# Patient Record
Sex: FEMALE | Race: WHITE | NOT HISPANIC OR LATINO | Employment: OTHER | URBAN - METROPOLITAN AREA
[De-identification: names, ages, dates, MRNs, and addresses within clinical notes are randomized per-mention and may not be internally consistent; named-entity substitution may affect disease eponyms.]

---

## 2018-06-08 ENCOUNTER — OFFICE VISIT (OUTPATIENT)
Dept: URGENT CARE | Facility: CLINIC | Age: 23
End: 2018-06-08
Payer: COMMERCIAL

## 2018-06-08 VITALS
RESPIRATION RATE: 16 BRPM | DIASTOLIC BLOOD PRESSURE: 67 MMHG | OXYGEN SATURATION: 100 % | TEMPERATURE: 99 F | HEIGHT: 64 IN | HEART RATE: 69 BPM | WEIGHT: 100 LBS | BODY MASS INDEX: 17.07 KG/M2 | SYSTOLIC BLOOD PRESSURE: 110 MMHG

## 2018-06-08 DIAGNOSIS — J06.9 VIRAL UPPER RESPIRATORY TRACT INFECTION: Primary | ICD-10-CM

## 2018-06-08 PROCEDURE — 99203 OFFICE O/P NEW LOW 30 MIN: CPT | Performed by: EMERGENCY MEDICINE

## 2018-06-08 RX ORDER — BUSPIRONE HYDROCHLORIDE 7.5 MG/1
7.5 TABLET ORAL DAILY
COMMUNITY
End: 2019-03-14

## 2018-06-08 RX ORDER — AZITHROMYCIN 250 MG/1
TABLET, FILM COATED ORAL
Qty: 6 TABLET | Refills: 0 | Status: SHIPPED | OUTPATIENT
Start: 2018-06-08 | End: 2018-06-12

## 2018-06-08 RX ORDER — CIMETIDINE 300 MG/1
300 TABLET, FILM COATED ORAL 2 TIMES DAILY
COMMUNITY
End: 2021-08-14

## 2018-06-08 RX ORDER — PREDNISONE 10 MG/1
10 TABLET ORAL DAILY
Qty: 19 TABLET | Refills: 0 | Status: SHIPPED | OUTPATIENT
Start: 2018-06-08 | End: 2018-06-15

## 2018-06-08 RX ORDER — CALCIUM POLYCARBOPHIL 625 MG 625 MG/1
625 TABLET ORAL DAILY
COMMUNITY
End: 2019-03-14

## 2018-06-08 RX ORDER — DIAZEPAM 10 MG/1
5 TABLET ORAL DAILY
COMMUNITY
End: 2021-08-14

## 2018-06-08 RX ORDER — DRONABINOL 10 MG/1
20 CAPSULE ORAL
COMMUNITY
End: 2019-03-14

## 2018-06-08 RX ORDER — LAMOTRIGINE 100 MG/1
25 TABLET ORAL DAILY
COMMUNITY
End: 2021-08-14

## 2018-06-08 RX ORDER — METHENAMINE, SODIUM PHOSPHATE, MONOBASIC, METHYLENE BLUE, AND HYOSCYAMINE SULFATE 81.6; 40.8; 10.8; .12 MG/1; MG/1; MG/1; MG/1
TABLET, COATED ORAL
COMMUNITY
End: 2019-03-14

## 2018-06-08 RX ORDER — SACCHAROMYCES BOULARDII 250 MG
250 CAPSULE ORAL 2 TIMES DAILY
COMMUNITY
End: 2019-03-14

## 2018-06-08 NOTE — PATIENT INSTRUCTIONS
You have been diagnosed with a Viral Upper Respiratory infection and your symptoms should resolve over the next 7 to 10 days with the treatments recommended today  If they do not, it is possible that you have developed a bacterial infection and you should begin taking the antibiotic prescribed at that time  Take an expectorant - guaifenesin should be the only ingredient - during the day, and the cough suppressant if needed at night only  Take Zinc 12 5 to 15 mg every 2 - 3 hrs while awake for the next few days  You may take Cold Osei (13 3 mg of Zinc) or split a 25 mg Zinc tablet or lozenge in two or a 50 mg into four to get the proper dose  The total daily dose of Zinc should exceed 75 mg per day  Upper Respiratory Infection, Ambulatory Care   GENERAL INFORMATION:   An upper respiratory infection  is also called a common cold  It can affect your nose, throat, ears, and sinuses  Common symptoms include the following:   · Runny or stuffy nose    · Sneezing and coughing    · Sore throat or hoarseness    · Red, watery, and sore eyes    · Tiredness or restlessness    · Chills and fever    · Headache, body aches, or sore muscles  Seek immediate care for the following symptoms:   · Headaches or a stiff neck    · Bright lights hurt your eyes    · Chest pain or trouble breathing  Treatment for an upper respiratory infection  may include any of the following:  · Decongestants  help decrease nasal congestion and improve your breathing  Do not use decongestant sprays for more than a few days  · Cough suppressants  help decrease coughing  Ask your healthcare provider which type of cough medicine is best for you  Some cough medicines need a doctor's order  · NSAIDs  help decrease swelling and pain or fever  This medicine is available with or without a doctor's order  NSAIDs can cause stomach bleeding or kidney problems in certain people   If you take blood thinner medicine, always ask your healthcare provider if NSAIDs are safe for you  Always read the medicine label and follow directions  Care for an upper respiratory infection:   · Rest  until your fever is gone or you feel better  · Drink liquids as directed to prevent dehydration  You may need to drink 8 to 10 cups of liquid each day  Good liquids to drink include water, ginger ale, tea, or fruit juices  · Gargle  with warm salt water to help your sore throat feel better  Mix ¼ teaspoon salt with 1 cup warm water  You may also suck on hard candy or throat lozenges  · Saline nasal drops  help loosen your nasal congestion  They can be bought without a doctor's order  · Take a warm bath or shower  to help decrease body aches and help you breathe easier  · Use a cool-mist humidifier  to increase air moisture and make it easier for you to breathe  Prevent the spread of germs:   · Avoid others for the first 2 to 3 days of your cold  Germs are easily spread during this time  · Do not share food, drinks,  towels, or personal items with others  · Wash your hands often  Use soap and water  Wash your hands after you use the bathroom, change a child's diapers, or sneeze  Wash your hands before you prepare or eat food  Cover your mouth and nose with a tissue when you sneeze or cough  Follow up with your healthcare provider as directed:  Write down your questions so you remember to ask them during your visits  CARE AGREEMENT:   You have the right to help plan your care  Learn about your health condition and how it may be treated  Discuss treatment options with your caregivers to decide what care you want to receive  You always have the right to refuse treatment  The above information is an  only  It is not intended as medical advice for individual conditions or treatments  Talk to your doctor, nurse or pharmacist before following any medical regimen to see if it is safe and effective for you    © 2014 [facility]  Information is for End User's use only and may not be sold, redistributed or otherwise used for commercial purposes  All illustrations and images included in CareNotes® are the copyrighted property of A D A M , Inc  or Kostas Ponce

## 2018-06-08 NOTE — LETTER
June 8, 2018     Patient: Oliver Araiza   YOB: 1995   Date of Visit: 6/8/2018       To Whom it May Concern:    Oliver Araiza was seen in my clinic on 6/8/2018  She may return to work on 06/11/2018  If you have any questions or concerns, please don't hesitate to call    Excuse also 6/07, 08 09, 10       Sincerely,          Crista Pena MD        CC: No Recipients

## 2018-06-09 NOTE — PROGRESS NOTES
[facility]        NAME: Ralf Khalil is a 20 y o  female  : 1995    MRN: [id number]  DATE: 2018  TIME: 8:46 PM    Assessment and Plan   Viral upper respiratory tract infection [J06 9]  1  Viral upper respiratory tract infection  predniSONE 10 mg tablet    azithromycin (ZITHROMAX) 250 mg tablet         Patient Instructions     Patient Instructions   You have been diagnosed with a Viral Upper Respiratory infection and your symptoms should resolve over the next 7 to 10 days with the treatments recommended today  If they do not, it is possible that you have developed a bacterial infection and you should begin taking the antibiotic prescribed at that time  Take an expectorant - guaifenesin should be the only ingredient - during the day, and the cough suppressant if needed at night only  Take Zinc 12 5 to 15 mg every 2 - 3 hrs while awake for the next few days  You may take Cold Osei (13 3 mg of Zinc) or split a 25 mg Zinc tablet or lozenge in two or a 50 mg into four to get the proper dose  The total daily dose of Zinc should exceed 75 mg per day  Upper Respiratory Infection, Ambulatory Care   GENERAL INFORMATION:   An upper respiratory infection  is also called a common cold  It can affect your nose, throat, ears, and sinuses  Common symptoms include the following:   · Runny or stuffy nose    · Sneezing and coughing    · Sore throat or hoarseness    · Red, watery, and sore eyes    · Tiredness or restlessness    · Chills and fever    · Headache, body aches, or sore muscles  Seek immediate care for the following symptoms:   · Headaches or a stiff neck    · Bright lights hurt your eyes    · Chest pain or trouble breathing  Treatment for an upper respiratory infection  may include any of the following:  · Decongestants  help decrease nasal congestion and improve your breathing  Do not use decongestant sprays for more than a few days  · Cough suppressants  help decrease coughing   Ask your healthcare provider which type of cough medicine is best for you  Some cough medicines need a doctor's order  · NSAIDs  help decrease swelling and pain or fever  This medicine is available with or without a doctor's order  NSAIDs can cause stomach bleeding or kidney problems in certain people  If you take blood thinner medicine, always ask your healthcare provider if NSAIDs are safe for you  Always read the medicine label and follow directions  Care for an upper respiratory infection:   · Rest  until your fever is gone or you feel better  · Drink liquids as directed to prevent dehydration  You may need to drink 8 to 10 cups of liquid each day  Good liquids to drink include water, ginger ale, tea, or fruit juices  · Gargle  with warm salt water to help your sore throat feel better  Mix ¼ teaspoon salt with 1 cup warm water  You may also suck on hard candy or throat lozenges  · Saline nasal drops  help loosen your nasal congestion  They can be bought without a doctor's order  · Take a warm bath or shower  to help decrease body aches and help you breathe easier  · Use a cool-mist humidifier  to increase air moisture and make it easier for you to breathe  Prevent the spread of germs:   · Avoid others for the first 2 to 3 days of your cold  Germs are easily spread during this time  · Do not share food, drinks,  towels, or personal items with others  · Wash your hands often  Use soap and water  Wash your hands after you use the bathroom, change a child's diapers, or sneeze  Wash your hands before you prepare or eat food  Cover your mouth and nose with a tissue when you sneeze or cough  Follow up with your healthcare provider as directed:  Write down your questions so you remember to ask them during your visits  CARE AGREEMENT:   You have the right to help plan your care  Learn about your health condition and how it may be treated   Discuss treatment options with your caregivers to decide what care you want to receive  You always have the right to refuse treatment  The above information is an  only  It is not intended as medical advice for individual conditions or treatments  Talk to your doctor, nurse or pharmacist before following any medical regimen to see if it is safe and effective for you  © 2014 [address] is for End User's use only and may not be sold, redistributed or otherwise used for commercial purposes  All illustrations and images included in CareNotes® are the copyrighted property of A D A M , Inc  or Kostas Kris  Follow up with PCP in 3-5 days  Proceed to  ER if symptoms worsen  Chief Complaint     Chief Complaint   Patient presents with    Fever     fever,body aches, extreme fatigue, and chest tightness  everyone in family has some kind of infection at this time and she feels she needs an antibiotic         History of Present Illness       Patient complains of nasal congestion, nonproductive cough, fever, chills and generalized aches for the past 3 days  Review of Systems   Review of Systems   Constitutional: Positive for chills and fever  HENT: Positive for congestion, rhinorrhea, sinus pressure and sore throat  Negative for trouble swallowing and voice change  Respiratory: Positive for cough  Negative for chest tightness, shortness of breath and wheezing  Cardiovascular: Negative for chest pain and palpitations           Current Medications       Current Outpatient Prescriptions:     busPIRone (BUSPAR) 7 5 mg tablet, Take 7 5 mg by mouth daily, Disp: , Rfl:     calcium polycarbophil (FIBERCON) 625 mg tablet, Take 625 mg by mouth daily, Disp: , Rfl:     cimetidine (TAGAMET) 300 MG tablet, Take 300 mg by mouth 2 (two) times a day, Disp: , Rfl:     diazepam (VALIUM) 10 mg tablet, Take 5 mg by mouth daily, Disp: , Rfl:     dronabinol (MARINOL) 10 MG capsule, Take 20 mg by mouth 2 (two) times a day before meals, Disp: , Rfl:     lamoTRIgine (LaMICtal) 100 mg tablet, Take 25 mg by mouth daily, Disp: , Rfl:     Methen-Hyosc-Meth Blue-Na Phos (UROGESIC-BLUE) 81 6 MG TABS, Take by mouth, Disp: , Rfl:     saccharomyces boulardii (FLORASTOR) 250 mg capsule, Take 250 mg by mouth 2 (two) times a day, Disp: , Rfl:     azithromycin (ZITHROMAX) 250 mg tablet, Take 2 tablets today then 1 tablet daily x 4 days, Disp: 6 tablet, Rfl: 0    predniSONE 10 mg tablet, Take 1 tablet (10 mg total) by mouth daily for 7 days Take once daily all days pills on following schedule  4 - 4 - 3 - 3 - 2 - 2 - 1, Disp: 19 tablet, Rfl: 0    Current Allergies     Allergies as of 06/08/2018 - Reviewed 06/08/2018   Allergen Reaction Noted    Reglan [metoclopramide] Anaphylaxis 06/08/2018    Iodine solution [povidone iodine] Hives 06/08/2018            The following portions of the patient's history were reviewed and updated as appropriate: allergies, current medications, past family history, past medical history, past social history, past surgical history and problem list      Past Medical History:   Diagnosis Date    Anxiety     Arthritis     Bipolar 1 disorder ([facility] [place] [address] )     Borderline personality disorder     Calculus of GB/BD, acute cystis w/ obst     Chronic pain     Depression     Endometriosis     Fibromyalgia     History of stomach ulcers     Migraine     Pelvic floor dysfunction     PTSD (post-traumatic stress disorder)        Past Surgical History:   Procedure Laterality Date    ADENOIDECTOMY      APPENDECTOMY      TONSILLECTOMY         No family history on file  Medications have been verified  Objective   /67   Pulse 69   Temp 99 °F (37 2 °C) (Tympanic)   Resp 16   Ht 5' 4" (1 626 m)   Wt 45 4 kg (100 lb)   LMP 06/08/2018   SpO2 100%   BMI 17 16 kg/m²        Physical Exam     Physical Exam   Constitutional: She is oriented to person, place, and time   She appears well-developed and well-nourished  No distress  HENT:   Head: Normocephalic and atraumatic  Right Ear: Tympanic membrane and external ear normal    Left Ear: Tympanic membrane and external ear normal    Nose: Mucosal edema present  Mouth/Throat: Posterior oropharyngeal erythema present  No oropharyngeal exudate or tonsillar abscesses  Neck: Neck supple  Cardiovascular: Normal rate and regular rhythm  Exam reveals no gallop  No murmur heard  Pulmonary/Chest: Effort normal  No respiratory distress  She has no wheezes  She has no rales  She exhibits no tenderness  Abdominal: Soft  Bowel sounds are normal    Neurological: She is alert and oriented to person, place, and time  Skin: Skin is warm and dry  Nursing note and vitals reviewed

## 2019-03-14 ENCOUNTER — OFFICE VISIT (OUTPATIENT)
Dept: GASTROENTEROLOGY | Facility: MEDICAL CENTER | Age: 24
End: 2019-03-14
Payer: COMMERCIAL

## 2019-03-14 VITALS
HEART RATE: 83 BPM | TEMPERATURE: 98.4 F | SYSTOLIC BLOOD PRESSURE: 126 MMHG | DIASTOLIC BLOOD PRESSURE: 80 MMHG | WEIGHT: 105.6 LBS | BODY MASS INDEX: 18.13 KG/M2

## 2019-03-14 DIAGNOSIS — R11.0 NAUSEA: ICD-10-CM

## 2019-03-14 DIAGNOSIS — E44.0 MODERATE PROTEIN-CALORIE MALNUTRITION (HCC): ICD-10-CM

## 2019-03-14 DIAGNOSIS — R63.4 WEIGHT LOSS: ICD-10-CM

## 2019-03-14 DIAGNOSIS — K59.04 CHRONIC IDIOPATHIC CONSTIPATION: ICD-10-CM

## 2019-03-14 DIAGNOSIS — R10.13 DYSPEPSIA: Primary | ICD-10-CM

## 2019-03-14 DIAGNOSIS — R10.9 STOMACH PAIN: ICD-10-CM

## 2019-03-14 PROCEDURE — 99244 OFF/OP CNSLTJ NEW/EST MOD 40: CPT | Performed by: INTERNAL MEDICINE

## 2019-03-14 RX ORDER — LIDOCAINE AND PRILOCAINE 25; 25 MG/G; MG/G
CREAM TOPICAL
Refills: 0 | COMMUNITY
Start: 2019-02-07 | End: 2021-08-16 | Stop reason: HOSPADM

## 2019-03-14 NOTE — PROGRESS NOTES
[facility] - Outpatient Consultation  Maria Elena Kyle 20 y o  female MRN: [id number]  Encounter: [id number]          ASSESSMENT AND PLAN:    Val was seen today for multiple GI complaints  She has seen multiple different Gastroenterologists  Recently had hysterectomy  Dyspepsia- will retest for these and I have advised the pt to quit all of her multiple different OTC herbals and then based on this, would make some recommendations  I hesitate to try any meds currently as she states every medication that she has tried has failed and she isn't really interested in trying anything new currently  -     Tissue transglutaminase, IgA; Future  -     IgA; Future  -     H  pylori antigen, stool; Future  -     C-reactive protein; Future  -     Calprotectin,Fecal; Future  -     Ambulatory referral to Nutrition Services; Future    Nausea    Stomach pain  She is underweight and has had a history of anorexia, I will refer her to a dietician to help her learn how to gain weight   -will check HIV and Hepatitis as well given weight loss and risk factors for Hep C including multiple tattoos and piercings    Follow up in 2 months time     ______________________________________________________________________    HPI:    Maria Elena Kyle is a 20 y o  female here for multiple GI complaints  She's had IBS for most of her life  She had an appendectomy when she was 2 after it had perforated which is when she believes the symptoms started  She had been having a lot of vaginal  bleeding from which she had a hysterectomy  She is constantanly nauseous and always has stomach pain  She has severe bloating and pain even from simple things like water, which causes her to not eat much  She is now having more constipation instead of diarrhea  She is unable to gain weight, she is constantly losing weight  This began after a pregnancy/  Currently her weight is more stable         Gastric emptying study January 2018  Se[facility] at [facility] in February 2018: small bowel follow through small hiatal hernia with some reflux and few adhesions in ileum  REVIEW OF SYSTEMS:    CONSTITUTIONAL: Denies any fever, chills, rigors, and weight loss  HEENT: No earache or tinnitus  Denies hearing loss or visual disturbances  CARDIOVASCULAR: No chest pain or palpitations  RESPIRATORY: Denies any cough, hemoptysis, shortness of breath or dyspnea on exertion  GASTROINTESTINAL: As noted in the History of Present Illness  GENITOURINARY: No problems with urination  Denies any hematuria or dysuria  NEUROLOGIC: No dizziness or vertigo, denies headaches  MUSCULOSKELETAL: Denies any muscle or joint pain  SKIN: Denies skin rashes or itching  ENDOCRINE: Denies excessive thirst  Denies intolerance to heat or cold  PSYCHOSOCIAL: Denies depression or anxiety  Denies any recent memory loss  Historical Information   Past Medical History:   Diagnosis Date    Anxiety     Arthritis     [facility]ipolar 1 [facility] [address]     Borderline personality disorder     Calculus of GB/BD, acute cystis w/ obst     Chronic pain     Depression     Endometriosis     Fibromyalgia     History of stomach ulcers     Migraine     Pelvic floor dysfunction     PTSD (post-traumatic stress disorder)      Past Surgical History:   Procedure Laterality Date    ADENOIDECTOMY      APPENDECTOMY      TONSILLECTOMY       Social History   Social History     Substance and Sexual Activity   Alcohol Use Not on file     Social History     Substance and Sexual Activity   Drug Use Not on file     Social History     Tobacco Use   Smoking Status Never Smoker   Smokeless Tobacco Never Used     No family history on file      Meds/Allergies       Current Outpatient Medications:     busPIRone (BUSPAR) 7 5 mg tablet    calcium polycarbophil (FIBERCON) 625 mg tablet    cimetidine (TAGAMET) 300 MG tablet    diazepam (VALIUM) 10 mg tablet    dronabinol (MARINOL) 10 MG capsule    lamoTRIgine (LaMICtal) 100 mg tablet    Methen-Hyosc-Meth Blue-Na Phos (UROGESIC-BLUE) 81 6 MG TABS    saccharomyces boulardii (FLORASTOR) 250 mg capsule    Allergies   Allergen Reactions    Reglan [Metoclopramide] Anaphylaxis    Iodine Solution [Povidone Iodine] Hives           Objective     There were no vitals taken for this visit  There is no height or weight on file to calculate BMI  PHYSICAL EXAM:      General Appearance:   Alert, cooperative, no distress, thin, multiple tattooes and piercings   HEENT:   Normocephalic, atraumatic, anicteric; multiple tattoes and piercings      Neck:  Supple, symmetrical, trachea midline   Lungs:   Clear to auscultation bilaterally; no rales, rhonchi or wheezing; respirations unlabored    Heart[de-identified]   Regular rate and rhythm; no murmur, rub, or gallop  Abdomen:   Soft, non-tender, non-distended; normal bowel sounds; no masses, no organomegaly    Genitalia:   Deferred    Rectal:   Deferred    Extremities:  No cyanosis, clubbing or edema    Pulses:  2+ and symmetric    Skin:  No jaundice, rashes, or lesions    Lymph nodes:  No palpable cervical lymphadenopathy        Lab Results:   Multiple labs and imaging tests reviewed in care everywhere    Attestation:   By signing my name below, IMakenzie, attest that this documentation has    been prepared under the direction and in the presence of GREGORY Recinos  Electronically Signed: Bernadette Cavazos  3/14/19      IJony personally performed the services described in this    documentation  All medical record entries made by the susieibmunira were at my    direction and in my presence  I have reviewed the chart and discharge    instructions and agree that the record reflects my personal performance and is    accurate and complete   GREGORY Recinos  3/14/19

## 2019-03-19 ENCOUNTER — TELEPHONE (OUTPATIENT)
Dept: GASTROENTEROLOGY | Facility: AMBULARY SURGERY CENTER | Age: 24
End: 2019-03-19

## 2019-03-19 NOTE — TELEPHONE ENCOUNTER
chaput patient    Her dietician named Brittany Nagy from [facility] would like to speak with you about this mutual patient        Call back  # [phone]

## 2019-03-22 NOTE — TELEPHONE ENCOUNTER
Pasha the dietician returned call stating she can be reached at [phone] [facility] or [facility] [place]  When calling this number you must say her name "Hardeep Corrigan" or say "JOISE [place] Dietician" and then you will be transferred to her

## 2021-08-11 ENCOUNTER — HOSPITAL ENCOUNTER (EMERGENCY)
Facility: HOSPITAL | Age: 26
Discharge: HOME | End: 2021-08-11
Attending: STUDENT IN AN ORGANIZED HEALTH CARE EDUCATION/TRAINING PROGRAM | Admitting: EMERGENCY MEDICINE
Payer: MEDICARE

## 2021-08-11 VITALS
SYSTOLIC BLOOD PRESSURE: 106 MMHG | HEART RATE: 94 BPM | BODY MASS INDEX: 18.66 KG/M2 | RESPIRATION RATE: 19 BRPM | OXYGEN SATURATION: 96 % | DIASTOLIC BLOOD PRESSURE: 72 MMHG | WEIGHT: 112 LBS | TEMPERATURE: 98.9 F | HEIGHT: 65 IN

## 2021-08-11 DIAGNOSIS — K52.9 GASTROENTERITIS: Primary | ICD-10-CM

## 2021-08-11 LAB
ALBUMIN SERPL BCP-MCNC: 4.1 G/DL (ref 3.5–5)
ALP SERPL-CCNC: 74 U/L (ref 46–116)
ALT SERPL W P-5'-P-CCNC: 28 U/L (ref 12–78)
ANION GAP SERPL CALCULATED.3IONS-SCNC: 10 MMOL/L (ref 4–13)
AST SERPL W P-5'-P-CCNC: 33 U/L (ref 5–45)
BASOPHILS # BLD AUTO: 0.02 THOUSANDS/ΜL (ref 0–0.1)
BASOPHILS NFR BLD AUTO: 0 % (ref 0–1)
BILIRUB SERPL-MCNC: 0.36 MG/DL (ref 0.2–1)
BILIRUB UR QL STRIP: ABNORMAL
BUN SERPL-MCNC: 9 MG/DL (ref 5–25)
CALCIUM SERPL-MCNC: 8.9 MG/DL (ref 8.3–10.1)
CHLORIDE SERPL-SCNC: 106 MMOL/L (ref 100–108)
CLARITY UR: CLEAR
CO2 SERPL-SCNC: 27 MMOL/L (ref 21–32)
COLOR UR: YELLOW
CREAT SERPL-MCNC: 0.97 MG/DL (ref 0.6–1.3)
EOSINOPHIL # BLD AUTO: 0.1 THOUSAND/ΜL (ref 0–0.61)
EOSINOPHIL NFR BLD AUTO: 2 % (ref 0–6)
ERYTHROCYTE [DISTWIDTH] IN BLOOD BY AUTOMATED COUNT: 12.7 % (ref 11.6–15.1)
GFR SERPL CREATININE-BSD FRML MDRD: 80 ML/MIN/1.73SQ M
GLUCOSE SERPL-MCNC: 85 MG/DL (ref 65–140)
GLUCOSE UR STRIP-MCNC: NEGATIVE MG/DL
HCT VFR BLD AUTO: 47 % (ref 34.8–46.1)
HGB BLD-MCNC: 15.6 G/DL (ref 11.5–15.4)
HGB UR QL STRIP.AUTO: NEGATIVE
IMM GRANULOCYTES # BLD AUTO: 0.01 THOUSAND/UL (ref 0–0.2)
IMM GRANULOCYTES NFR BLD AUTO: 0 % (ref 0–2)
KETONES UR STRIP-MCNC: ABNORMAL MG/DL
LEUKOCYTE ESTERASE UR QL STRIP: NEGATIVE
LIPASE SERPL-CCNC: 92 U/L (ref 73–393)
LYMPHOCYTES # BLD AUTO: 0.88 THOUSANDS/ΜL (ref 0.6–4.47)
LYMPHOCYTES NFR BLD AUTO: 18 % (ref 14–44)
MCH RBC QN AUTO: 30.3 PG (ref 26.8–34.3)
MCHC RBC AUTO-ENTMCNC: 33.2 G/DL (ref 31.4–37.4)
MCV RBC AUTO: 91 FL (ref 82–98)
MONOCYTES # BLD AUTO: 0.62 THOUSAND/ΜL (ref 0.17–1.22)
MONOCYTES NFR BLD AUTO: 12 % (ref 4–12)
NEUTROPHILS # BLD AUTO: 3.36 THOUSANDS/ΜL (ref 1.85–7.62)
NEUTS SEG NFR BLD AUTO: 68 % (ref 43–75)
NITRITE UR QL STRIP: NEGATIVE
NRBC BLD AUTO-RTO: 0 /100 WBCS
PH UR STRIP.AUTO: 5.5 [PH]
PLATELET # BLD AUTO: 237 THOUSANDS/UL (ref 149–390)
PMV BLD AUTO: 11.7 FL (ref 8.9–12.7)
POTASSIUM SERPL-SCNC: 4.1 MMOL/L (ref 3.5–5.3)
PROT SERPL-MCNC: 8 G/DL (ref 6.4–8.2)
PROT UR STRIP-MCNC: NEGATIVE MG/DL
RBC # BLD AUTO: 5.15 MILLION/UL (ref 3.81–5.12)
SODIUM SERPL-SCNC: 143 MMOL/L (ref 136–145)
SP GR UR STRIP.AUTO: >=1.03 (ref 1–1.03)
UROBILINOGEN UR QL STRIP.AUTO: 0.2 E.U./DL
WBC # BLD AUTO: 4.99 THOUSAND/UL (ref 4.31–10.16)

## 2021-08-11 PROCEDURE — 96361 HYDRATE IV INFUSION ADD-ON: CPT

## 2021-08-11 PROCEDURE — 96374 THER/PROPH/DIAG INJ IV PUSH: CPT

## 2021-08-11 PROCEDURE — 81003 URINALYSIS AUTO W/O SCOPE: CPT | Performed by: STUDENT IN AN ORGANIZED HEALTH CARE EDUCATION/TRAINING PROGRAM

## 2021-08-11 PROCEDURE — 99284 EMERGENCY DEPT VISIT MOD MDM: CPT

## 2021-08-11 PROCEDURE — 99284 EMERGENCY DEPT VISIT MOD MDM: CPT | Performed by: STUDENT IN AN ORGANIZED HEALTH CARE EDUCATION/TRAINING PROGRAM

## 2021-08-11 PROCEDURE — 85025 COMPLETE CBC W/AUTO DIFF WBC: CPT | Performed by: STUDENT IN AN ORGANIZED HEALTH CARE EDUCATION/TRAINING PROGRAM

## 2021-08-11 PROCEDURE — 83690 ASSAY OF LIPASE: CPT | Performed by: STUDENT IN AN ORGANIZED HEALTH CARE EDUCATION/TRAINING PROGRAM

## 2021-08-11 PROCEDURE — 96375 TX/PRO/DX INJ NEW DRUG ADDON: CPT

## 2021-08-11 PROCEDURE — 36415 COLL VENOUS BLD VENIPUNCTURE: CPT | Performed by: STUDENT IN AN ORGANIZED HEALTH CARE EDUCATION/TRAINING PROGRAM

## 2021-08-11 PROCEDURE — 80053 COMPREHEN METABOLIC PANEL: CPT | Performed by: STUDENT IN AN ORGANIZED HEALTH CARE EDUCATION/TRAINING PROGRAM

## 2021-08-11 RX ORDER — KETOROLAC TROMETHAMINE 30 MG/ML
15 INJECTION, SOLUTION INTRAMUSCULAR; INTRAVENOUS ONCE
Status: COMPLETED | OUTPATIENT
Start: 2021-08-11 | End: 2021-08-11

## 2021-08-11 RX ORDER — ONDANSETRON 2 MG/ML
4 INJECTION INTRAMUSCULAR; INTRAVENOUS ONCE
Status: COMPLETED | OUTPATIENT
Start: 2021-08-11 | End: 2021-08-11

## 2021-08-11 RX ORDER — ONDANSETRON 4 MG/1
4 TABLET, ORALLY DISINTEGRATING ORAL EVERY 6 HOURS PRN
Qty: 12 TABLET | Refills: 0 | Status: SHIPPED | OUTPATIENT
Start: 2021-08-11

## 2021-08-11 RX ADMIN — ONDANSETRON 4 MG: 2 INJECTION INTRAMUSCULAR; INTRAVENOUS at 18:36

## 2021-08-11 RX ADMIN — KETOROLAC TROMETHAMINE 15 MG: 30 INJECTION, SOLUTION INTRAMUSCULAR at 18:37

## 2021-08-11 RX ADMIN — SODIUM CHLORIDE 1000 ML: 0.9 INJECTION, SOLUTION INTRAVENOUS at 18:38

## 2021-08-11 NOTE — ED PROVIDER NOTES
History  Chief Complaint   Patient presents with    Abdominal Pain     Pt reports generalized abd pain, N/V/D for the past 3 days  States others in the house have the same sx and believes she caught what they have,       History provided by:  Patient  Vomiting  Severity:  Moderate  Duration:  4 days  Timing:  Constant  Progression:  Worsening  Chronicity:  New  Recent urination:  Decreased  Relieved by:  Nothing  Worsened by:  Nothing  Ineffective treatments:  Ice chips  Associated symptoms: abdominal pain, chills, diarrhea and fever    Associated symptoms: no cough and no myalgias    Risk factors: sick contacts    Risk factors: no suspect food intake and no travel to endemic areas       27year old F  Hx of endometriosis, PUD  Presents to the ED with nausea, vomiting, diarrhea  Having symptoms x 4 days  Has had multiple sick contacts with similar symptoms  Expresses subjective fevers, chills  T max 99 F  COVID vaccinated  Having decreased oral intake over the past 3 days more acutely over the last 24 hours  Describes her abdominal pain as cramping in nature and 10/10 in severity  Nothing makes the pain better or worse  Denies melena, bloody stool  Denies antibiotic use, travel  S/p elective hysterectomy  Prior to Admission Medications   Prescriptions Last Dose Informant Patient Reported? Taking?    NON FORMULARY  Self Yes No   Sig: Med Name: Robina Haskins   NON FORMULARY  Self Yes No   Sig: Med Name: CBD, Vitex, Elderberry, Aloe vera, Lemon Balm,   NON FORMULARY  Self Yes No   Sig: Med Name: Echinacea purpurea   NON FORMULARY  Self Yes No   Sig: Med Name: licorice Root   NON FORMULARY  Self Yes No   Sig: Med Name: Binta [place]   NON FORMULARY  Self Yes No   Sig: Med Name: Rhodiola Root   NON FORMULARY  Self Yes No   Sig: Med Name: Eleuthero Root   NON FORMULARY  Self Yes No   Sig: Med Name: D-Mannose   Probiotic Product (PROBIOTIC ADVANCED PO)  Self Yes No   Sig: Take by mouth   cimetidine (TAGAMET) 300 MG tablet Self Yes No   Sig: Take 300 mg by mouth 2 (two) times a day   diazepam (VALIUM) 10 mg tablet  Self Yes No   Sig: Take 5 mg by mouth daily   lamoTRIgine (LaMICtal) 100 mg tablet  Self Yes No   Sig: Take 25 mg by mouth daily   lidocaine-prilocaine (EMLA) cream  Self Yes No   Sig: USE ONE APPLICATION TOPICALLY AS NEEDED FOR PAIN, USE AS DIRECTED      Facility-Administered Medications: None       Past Medical History:   Diagnosis Date    Anxiety     Arthritis     Bipolar 1 disorder (HCC)     Borderline personality disorder (HCC)     Calculus of GB/BD, acute cystis w/ obst     Chronic pain     Depression     Endometriosis     Fibromyalgia     History of stomach ulcers     Migraine     Pelvic floor dysfunction     PTSD (post-traumatic stress disorder)        Past Surgical History:   Procedure Laterality Date    ADENOIDECTOMY      APPENDECTOMY      TONSILLECTOMY         History reviewed  No pertinent family history  I have reviewed and agree with the history as documented  E-Cigarette/Vaping     E-Cigarette/Vaping Substances     Social History     Tobacco Use    Smoking status: Never Smoker    Smokeless tobacco: Never Used   Substance Use Topics    Alcohol use: Yes     Comment: once a year    Drug use: Yes     Types: Marijuana       Review of Systems   Constitutional: Positive for chills and fever  HENT: Negative for congestion, rhinorrhea and sinus pain  Eyes: Negative for pain and visual disturbance  Respiratory: Negative for cough and chest tightness  Cardiovascular: Negative for chest pain and palpitations  Gastrointestinal: Positive for abdominal pain, diarrhea, nausea and vomiting  Genitourinary: Positive for decreased urine volume  Negative for dysuria and flank pain  Musculoskeletal: Negative for back pain, myalgias, neck pain and neck stiffness  Skin: Negative for color change, pallor and rash  Neurological: Negative for dizziness, weakness and light-headedness  Hematological: Does not bruise/bleed easily  Psychiatric/Behavioral: Negative for agitation and confusion  All other systems reviewed and are negative  Physical Exam  Physical Exam  Vitals and nursing note reviewed  Constitutional:       General: She is not in acute distress  Appearance: She is ill-appearing  She is not toxic-appearing  HENT:      Head: Atraumatic  Mouth/Throat:      Mouth: Mucous membranes are moist       Pharynx: Oropharynx is clear  Eyes:      Extraocular Movements: Extraocular movements intact  Pupils: Pupils are equal, round, and reactive to light  Cardiovascular:      Rate and Rhythm: Normal rate and regular rhythm  Heart sounds: Normal heart sounds  Pulmonary:      Effort: Pulmonary effort is normal       Breath sounds: Normal breath sounds  No wheezing, rhonchi or rales  Chest:      Chest wall: No tenderness  Abdominal:      General: Abdomen is flat  Bowel sounds are normal       Palpations: Abdomen is soft  Tenderness: There is generalized abdominal tenderness  There is no guarding or rebound  Negative signs include Carlton's sign  Hernia: No hernia is present  Skin:     General: Skin is warm and dry  Capillary Refill: Capillary refill takes less than 2 seconds  Coloration: Skin is not mottled  Findings: No erythema  Neurological:      General: No focal deficit present  Mental Status: She is alert and oriented to person, place, and time  Cranial Nerves: No cranial nerve deficit  Motor: No weakness  Psychiatric:         Mood and Affect: Mood normal  Mood is not anxious or depressed           Behavior: Behavior normal        Vital Signs  ED Triage Vitals   Temperature Pulse Respirations Blood Pressure SpO2   08/11/21 1758 08/11/21 1758 08/11/21 1759 08/11/21 1759 08/11/21 1759   98 9 °F (37 2 °C) 94 19 106/72 96 %      Temp Source Heart Rate Source Patient Position - Orthostatic VS BP Location FiO2 (%) 08/11/21 1758 08/11/21 1758 08/11/21 1759 08/11/21 1759 --   Temporal Monitor Lying Right arm       Pain Score       08/11/21 1759       7           Vitals:    08/11/21 1758 08/11/21 1759   BP:  106/72   Pulse: 94    Patient Position - Orthostatic VS:  Lying     ED Medications  Medications   sodium chloride 0 9 % bolus 1,000 mL (1,000 mL Intravenous New Bag 8/11/21 1838)   ondansetron (ZOFRAN) injection 4 mg (4 mg Intravenous Given 8/11/21 1836)   ketorolac (TORADOL) injection 15 mg (15 mg Intravenous Given 8/11/21 1837)     Diagnostic Studies  Results Reviewed     Procedure Component Value Units Date/Time    Comprehensive metabolic panel [[id number]] Collected: 08/11/21 1836    Lab Status: Final result Specimen: Blood from Arm, Right Updated: 08/11/21 1901     Sodium 143 mmol/L      Potassium 4 1 mmol/L      Chloride 106 mmol/L      CO2 27 mmol/L      ANION GAP 10 mmol/L      BUN 9 mg/dL      Creatinine 0 97 mg/dL      Glucose 85 mg/dL      Calcium 8 9 mg/dL      AST 33 U/L      ALT 28 U/L      Alkaline Phosphatase 74 U/L      Total Protein 8 0 g/dL      Albumin 4 1 g/dL      Total Bilirubin 0 36 mg/dL      eGFR 80 ml/min/1 73sq m     Narrative:      [place] guidelines for Chronic Kidney Disease (CKD):     Stage 1 with normal or high GFR (GFR > 90 mL/min/1 73 square meters)    Stage 2 Mild CKD (GFR = 60-89 mL/min/1 73 square meters)    Stage 3A Moderate CKD (GFR = 45-59 mL/min/1 73 square meters)    Stage 3B Moderate CKD (GFR = 30-44 mL/min/1 73 square meters)    Stage 4 Severe CKD (GFR = 15-29 mL/min/1 73 square meters)    Stage 5 End Stage CKD (GFR <15 mL/min/1 73 square meters)  Note: GFR calculation is accurate only with a steady state creatinine    Lipase [[id number]]  (Normal) Collected: 08/11/21 1836    Lab Status: Final result Specimen: Blood from Arm, Right Updated: 08/11/21 1901     Lipase 92 u/L     CBC and differential [[id number]]  (Abnormal) Collected: 08/11/21 1836 Lab Status: Final result Specimen: Blood from Arm, Right Updated: 08/11/21 1847     WBC 4 99 Thousand/uL      RBC 5 15 Million/uL      Hemoglobin 15 6 g/dL      Hematocrit 47 0 %      MCV 91 fL      MCH 30 3 pg      MCHC 33 2 g/dL      RDW 12 7 %      MPV 11 7 fL      Platelets 527 Thousands/uL      nRBC 0 /100 WBCs      Neutrophils Relative 68 %      Immat GRANS % 0 %      Lymphocytes Relative 18 %      Monocytes Relative 12 %      Eosinophils Relative 2 %      Basophils Relative 0 %      Neutrophils Absolute 3 36 Thousands/µL      Immature Grans Absolute 0 01 Thousand/uL      Lymphocytes Absolute 0 88 Thousands/µL      Monocytes Absolute 0 62 Thousand/µL      Eosinophils Absolute 0 10 Thousand/µL      Basophils Absolute 0 02 Thousands/µL     UA w Reflex to Microscopic w Reflex to Culture [[id number]]  (Abnormal) Collected: 08/11/21 1831    Lab Status: Final result Specimen: Urine, Clean Catch Updated: 08/11/21 1843     Color, UA Yellow     Clarity, UA Clear     Specific Gravity, UA >=1 030     pH, UA 5 5     Leukocytes, UA Negative     Nitrite, UA Negative     Protein, UA Negative mg/dl      Glucose, UA Negative mg/dl      Ketones, UA 15 (1+) mg/dl      Urobilinogen, UA 0 2 E U /dl      Bilirubin, UA Small     Blood, UA Negative             No orders to display          Procedures  Procedures     ED Course     MDM     29-year-old female  Presents to the emergency department with nausea, vomiting, diarrhea and abdominal pain  Expresses subjective fever/chills  Has had multiple sick contacts  Low oral intake over the past few days  Lab significant for dehydration  Patient was administered a dose of IV ketorolac which improved her abdominal discomfort  IV fluid boluses are being administered  The patient was signed out to Dr Tamela Estrella  Plan discussed  The patient was stable while under my care      Disposition  Final diagnoses:   Gastroenteritis     ED Disposition     None      Follow-up Information    None Patient's Medications   Discharge Prescriptions    No medications on file     No discharge procedures on file      PDMP Review     None          ED Provider  Electronically Signed by           Isabella Cummings DO  08/11/21 5211

## 2021-08-11 NOTE — ED CARE HANDOFF
Emergency Department Sign Out Note        Sign out and transfer of care from Dr Joslyn Cristobal  See Separate Emergency Department note  The patient, Cedric Fiore, was evaluated by the previous provider for N/V/D      Workup Completed:  Labs Reviewed   CBC AND DIFFERENTIAL - Abnormal       Result Value Ref Range Status    WBC 4 99  4 31 - 10 16 Thousand/uL Final    RBC 5 15 (*) 3 81 - 5 12 Million/uL Final    Hemoglobin 15 6 (*) 11 5 - 15 4 g/dL Final    Hematocrit 47 0 (*) 34 8 - 46 1 % Final    MCV 91  82 - 98 fL Final    MCH 30 3  26 8 - 34 3 pg Final    MCHC 33 2  31 4 - 37 4 g/dL Final    RDW 12 7  11 6 - 15 1 % Final    MPV 11 7  8 9 - 12 7 fL Final    Platelets 750  590 - 390 Thousands/uL Final    nRBC 0  /100 WBCs Final    Neutrophils Relative 68  43 - 75 % Final    Immat GRANS % 0  0 - 2 % Final    Lymphocytes Relative 18  14 - 44 % Final    Monocytes Relative 12  4 - 12 % Final    Eosinophils Relative 2  0 - 6 % Final    Basophils Relative 0  0 - 1 % Final    Neutrophils Absolute 3 36  1 85 - 7 62 Thousands/µL Final    Immature Grans Absolute 0 01  0 00 - 0 20 Thousand/uL Final    Lymphocytes Absolute 0 88  0 60 - 4 47 Thousands/µL Final    Monocytes Absolute 0 62  0 17 - 1 22 Thousand/µL Final    Eosinophils Absolute 0 10  0 00 - 0 61 Thousand/µL Final    Basophils Absolute 0 02  0 00 - 0 10 Thousands/µL Final   UA W REFLEX TO MICROSCOPIC WITH REFLEX TO CULTURE - Abnormal    Color, UA Yellow   Final    Clarity, UA Clear   Final    Specific Gravity, UA >=1 030  1 003 - 1 030 Final    pH, UA 5 5  4 5, 5 0, 5 5, 6 0, 6 5, 7 0, 7 5, 8 0 Final    Leukocytes, UA Negative  Negative Final    Nitrite, UA Negative  Negative Final    Protein, UA Negative  Negative mg/dl Final    Glucose, UA Negative  Negative mg/dl Final    Ketones, UA 15 (1+) (*) Negative mg/dl Final    Urobilinogen, UA 0 2  0 2, 1 0 E U /dl E U /dl Final    Bilirubin, UA Small (*) Negative Final    Blood, UA Negative  Negative Final   LIPASE - Normal Lipase 92  73 - 393 u/L Final   COMPREHENSIVE METABOLIC PANEL    Sodium 371  136 - 145 mmol/L Final    Potassium 4 1  3 5 - 5 3 mmol/L Final    Chloride 106  100 - 108 mmol/L Final    CO2 27  21 - 32 mmol/L Final    ANION GAP 10  4 - 13 mmol/L Final    BUN 9  5 - 25 mg/dL Final    Creatinine 0 97  0 60 - 1 30 mg/dL Final    Comment: Standardized to IDMS reference method    Glucose 85  65 - 140 mg/dL Final    Comment: If the patient is fasting, the ADA then defines impaired fasting glucose as > 100 mg/dL and diabetes as > or equal to 123 mg/dL  Specimen collection should occur prior to Sulfasalazine administration due to the potential for falsely depressed results  Specimen collection should occur prior to Sulfapyridine administration due to the potential for falsely elevated results  Calcium 8 9  8 3 - 10 1 mg/dL Final    AST 33  5 - 45 U/L Final    Comment: Specimen collection should occur prior to Sulfasalazine administration due to the potential for falsely depressed results  ALT 28  12 - 78 U/L Final    Comment: Specimen collection should occur prior to Sulfasalazine administration due to the potential for falsely depressed results  Alkaline Phosphatase 74  46 - 116 U/L Final    Total Protein 8 0  6 4 - 8 2 g/dL Final    Albumin 4 1  3 5 - 5 0 g/dL Final    Total Bilirubin 0 36  0 20 - 1 00 mg/dL Final    Comment: Use of this assay is not recommended for patients undergoing treatment with eltrombopag due to the potential for falsely elevated results      eGFR 80  ml/min/1 73sq m Final    Narrative:     [place] guidelines for Chronic Kidney Disease (CKD):     Stage 1 with normal or high GFR (GFR > 90 mL/min/1 73 square meters)    Stage 2 Mild CKD (GFR = 60-89 mL/min/1 73 square meters)    Stage 3A Moderate CKD (GFR = 45-59 mL/min/1 73 square meters)    Stage 3B Moderate CKD (GFR = 30-44 mL/min/1 73 square meters)    Stage 4 Severe CKD (GFR = 15-29 mL/min/1 73 square ONDANSETRON (ZOFRAN-ODT) 4 MG DISINTEGRATING TABLET    Take 1 tablet (4 mg total) by mouth every 6 (six) hours as needed for nausea or vomiting       Start Date: 8/11/2021 End Date: --       Order Dose: 4 mg       Quantity: 12 tablet    Refills: 0     No discharge procedures on file         ED Provider  Electronically Signed by     Brent Snyder DO  08/11/21 6171

## 2021-08-14 ENCOUNTER — APPOINTMENT (EMERGENCY)
Dept: CT IMAGING | Facility: HOSPITAL | Age: 26
DRG: 392 | End: 2021-08-14
Payer: MEDICARE

## 2021-08-14 ENCOUNTER — HOSPITAL ENCOUNTER (INPATIENT)
Facility: HOSPITAL | Age: 26
LOS: 1 days | Discharge: HOME | DRG: 392 | End: 2021-08-16
Attending: STUDENT IN AN ORGANIZED HEALTH CARE EDUCATION/TRAINING PROGRAM | Admitting: FAMILY MEDICINE
Payer: MEDICARE

## 2021-08-14 DIAGNOSIS — R10.84 DIFFUSE ABDOMINAL PAIN: ICD-10-CM

## 2021-08-14 DIAGNOSIS — R11.2 INTRACTABLE VOMITING WITH NAUSEA, UNSPECIFIED VOMITING TYPE: ICD-10-CM

## 2021-08-14 DIAGNOSIS — R21 RASH: ICD-10-CM

## 2021-08-14 DIAGNOSIS — K52.9 GASTROENTERITIS: Primary | ICD-10-CM

## 2021-08-14 PROBLEM — E87.6 HYPOKALEMIA: Status: ACTIVE | Noted: 2021-08-14

## 2021-08-14 PROBLEM — F99 PSYCHIATRIC ILLNESS: Status: ACTIVE | Noted: 2021-08-14

## 2021-08-14 LAB
ALBUMIN SERPL BCP-MCNC: 3.6 G/DL (ref 3.5–5)
ALP SERPL-CCNC: 51 U/L (ref 46–116)
ALT SERPL W P-5'-P-CCNC: 25 U/L (ref 12–78)
ANION GAP SERPL CALCULATED.3IONS-SCNC: 15 MMOL/L (ref 4–13)
AST SERPL W P-5'-P-CCNC: 33 U/L (ref 5–45)
BASOPHILS # BLD MANUAL: 0 THOUSAND/UL (ref 0–0.1)
BASOPHILS NFR MAR MANUAL: 0 % (ref 0–1)
BILIRUB SERPL-MCNC: 0.38 MG/DL (ref 0.2–1)
BUN SERPL-MCNC: 7 MG/DL (ref 5–25)
CALCIUM SERPL-MCNC: 8.3 MG/DL (ref 8.3–10.1)
CHLORIDE SERPL-SCNC: 100 MMOL/L (ref 100–108)
CO2 SERPL-SCNC: 21 MMOL/L (ref 21–32)
CREAT SERPL-MCNC: 0.75 MG/DL (ref 0.6–1.3)
EOSINOPHIL # BLD MANUAL: 0.08 THOUSAND/UL (ref 0–0.4)
EOSINOPHIL NFR BLD MANUAL: 2 % (ref 0–6)
ERYTHROCYTE [DISTWIDTH] IN BLOOD BY AUTOMATED COUNT: 12.6 % (ref 11.6–15.1)
GFR SERPL CREATININE-BSD FRML MDRD: 109 ML/MIN/1.73SQ M
GLUCOSE SERPL-MCNC: 81 MG/DL (ref 65–140)
HCT VFR BLD AUTO: 42 % (ref 34.8–46.1)
HGB BLD-MCNC: 14.5 G/DL (ref 11.5–15.4)
LACTATE SERPL-SCNC: 0.8 MMOL/L (ref 0.5–2)
LIPASE SERPL-CCNC: 92 U/L (ref 73–393)
LYMPHOCYTES # BLD AUTO: 1.3 THOUSAND/UL (ref 0.6–4.47)
LYMPHOCYTES # BLD AUTO: 32 % (ref 14–44)
MAGNESIUM SERPL-MCNC: 1.7 MG/DL (ref 1.6–2.6)
MCH RBC QN AUTO: 30.3 PG (ref 26.8–34.3)
MCHC RBC AUTO-ENTMCNC: 34.5 G/DL (ref 31.4–37.4)
MCV RBC AUTO: 88 FL (ref 82–98)
MONOCYTES # BLD AUTO: 0.41 THOUSAND/UL (ref 0–1.22)
MONOCYTES NFR BLD: 10 % (ref 4–12)
NEUTROPHILS # BLD MANUAL: 1.95 THOUSAND/UL (ref 1.85–7.62)
NEUTS BAND NFR BLD MANUAL: 4 % (ref 0–8)
NEUTS SEG NFR BLD AUTO: 44 % (ref 43–75)
PHOSPHATE SERPL-MCNC: 3.1 MG/DL (ref 2.7–4.5)
PLATELET # BLD AUTO: 202 THOUSANDS/UL (ref 149–390)
PLATELET BLD QL SMEAR: ADEQUATE
PMV BLD AUTO: 11.6 FL (ref 8.9–12.7)
POTASSIUM SERPL-SCNC: 3.3 MMOL/L (ref 3.5–5.3)
PROT SERPL-MCNC: 7.2 G/DL (ref 6.4–8.2)
RBC # BLD AUTO: 4.78 MILLION/UL (ref 3.81–5.12)
SODIUM SERPL-SCNC: 136 MMOL/L (ref 136–145)
TOTAL CELLS COUNTED SPEC: 100
VARIANT LYMPHS # BLD AUTO: 8 %
WBC # BLD AUTO: 4.06 THOUSAND/UL (ref 4.31–10.16)

## 2021-08-14 PROCEDURE — 85025 COMPLETE CBC W/AUTO DIFF WBC: CPT | Performed by: STUDENT IN AN ORGANIZED HEALTH CARE EDUCATION/TRAINING PROGRAM

## 2021-08-14 PROCEDURE — 87505 NFCT AGENT DETECTION GI: CPT | Performed by: STUDENT IN AN ORGANIZED HEALTH CARE EDUCATION/TRAINING PROGRAM

## 2021-08-14 PROCEDURE — 96375 TX/PRO/DX INJ NEW DRUG ADDON: CPT

## 2021-08-14 PROCEDURE — 83735 ASSAY OF MAGNESIUM: CPT | Performed by: STUDENT IN AN ORGANIZED HEALTH CARE EDUCATION/TRAINING PROGRAM

## 2021-08-14 PROCEDURE — 80053 COMPREHEN METABOLIC PANEL: CPT | Performed by: STUDENT IN AN ORGANIZED HEALTH CARE EDUCATION/TRAINING PROGRAM

## 2021-08-14 PROCEDURE — 83605 ASSAY OF LACTIC ACID: CPT | Performed by: STUDENT IN AN ORGANIZED HEALTH CARE EDUCATION/TRAINING PROGRAM

## 2021-08-14 PROCEDURE — 87493 C DIFF AMPLIFIED PROBE: CPT | Performed by: STUDENT IN AN ORGANIZED HEALTH CARE EDUCATION/TRAINING PROGRAM

## 2021-08-14 PROCEDURE — 83690 ASSAY OF LIPASE: CPT | Performed by: STUDENT IN AN ORGANIZED HEALTH CARE EDUCATION/TRAINING PROGRAM

## 2021-08-14 PROCEDURE — 99285 EMERGENCY DEPT VISIT HI MDM: CPT

## 2021-08-14 PROCEDURE — 36415 COLL VENOUS BLD VENIPUNCTURE: CPT | Performed by: STUDENT IN AN ORGANIZED HEALTH CARE EDUCATION/TRAINING PROGRAM

## 2021-08-14 PROCEDURE — 84100 ASSAY OF PHOSPHORUS: CPT | Performed by: STUDENT IN AN ORGANIZED HEALTH CARE EDUCATION/TRAINING PROGRAM

## 2021-08-14 PROCEDURE — G1004 CDSM NDSC: HCPCS

## 2021-08-14 PROCEDURE — 74176 CT ABD & PELVIS W/O CONTRAST: CPT

## 2021-08-14 PROCEDURE — 96365 THER/PROPH/DIAG IV INF INIT: CPT

## 2021-08-14 PROCEDURE — 85027 COMPLETE CBC AUTOMATED: CPT | Performed by: STUDENT IN AN ORGANIZED HEALTH CARE EDUCATION/TRAINING PROGRAM

## 2021-08-14 PROCEDURE — 99285 EMERGENCY DEPT VISIT HI MDM: CPT | Performed by: STUDENT IN AN ORGANIZED HEALTH CARE EDUCATION/TRAINING PROGRAM

## 2021-08-14 PROCEDURE — 85007 BL SMEAR W/DIFF WBC COUNT: CPT | Performed by: STUDENT IN AN ORGANIZED HEALTH CARE EDUCATION/TRAINING PROGRAM

## 2021-08-14 PROCEDURE — 87329 GIARDIA AG IA: CPT | Performed by: FAMILY MEDICINE

## 2021-08-14 PROCEDURE — 99220 PR INITIAL OBSERVATION CARE/DAY 70 MINUTES: CPT | Performed by: FAMILY MEDICINE

## 2021-08-14 RX ORDER — POTASSIUM CHLORIDE 20 MEQ/1
40 TABLET, EXTENDED RELEASE ORAL ONCE
Status: COMPLETED | OUTPATIENT
Start: 2021-08-14 | End: 2021-08-14

## 2021-08-14 RX ORDER — DICYCLOMINE HCL 20 MG
20 TABLET ORAL ONCE
Status: COMPLETED | OUTPATIENT
Start: 2021-08-14 | End: 2021-08-14

## 2021-08-14 RX ORDER — ONDANSETRON 2 MG/ML
4 INJECTION INTRAMUSCULAR; INTRAVENOUS ONCE
Status: COMPLETED | OUTPATIENT
Start: 2021-08-14 | End: 2021-08-14

## 2021-08-14 RX ORDER — SODIUM CHLORIDE 9 MG/ML
75 INJECTION, SOLUTION INTRAVENOUS CONTINUOUS
Status: DISCONTINUED | OUTPATIENT
Start: 2021-08-14 | End: 2021-08-15

## 2021-08-14 RX ORDER — ONDANSETRON 2 MG/ML
4 INJECTION INTRAMUSCULAR; INTRAVENOUS EVERY 6 HOURS PRN
Status: DISCONTINUED | OUTPATIENT
Start: 2021-08-14 | End: 2021-08-16 | Stop reason: HOSPADM

## 2021-08-14 RX ORDER — DIAPER,BRIEF,INFANT-TODD,DISP
EACH MISCELLANEOUS 3 TIMES DAILY
Status: DISCONTINUED | OUTPATIENT
Start: 2021-08-14 | End: 2021-08-16 | Stop reason: HOSPADM

## 2021-08-14 RX ADMIN — ONDANSETRON 4 MG: 2 INJECTION INTRAMUSCULAR; INTRAVENOUS at 04:32

## 2021-08-14 RX ADMIN — POTASSIUM CHLORIDE 40 MEQ: 1500 TABLET, EXTENDED RELEASE ORAL at 05:44

## 2021-08-14 RX ADMIN — DICYCLOMINE HYDROCHLORIDE 20 MG: 20 TABLET ORAL at 22:51

## 2021-08-14 RX ADMIN — SODIUM CHLORIDE 75 ML/HR: 0.9 INJECTION, SOLUTION INTRAVENOUS at 07:46

## 2021-08-14 RX ADMIN — HYDROCORTISONE: 1 CREAM TOPICAL at 15:58

## 2021-08-14 RX ADMIN — SODIUM CHLORIDE 75 ML/HR: 0.9 INJECTION, SOLUTION INTRAVENOUS at 20:05

## 2021-08-14 RX ADMIN — DICYCLOMINE HYDROCHLORIDE 20 MG: 20 TABLET ORAL at 05:43

## 2021-08-14 RX ADMIN — ONDANSETRON 4 MG: 2 INJECTION INTRAMUSCULAR; INTRAVENOUS at 15:57

## 2021-08-14 RX ADMIN — SODIUM CHLORIDE, SODIUM LACTATE, POTASSIUM CHLORIDE, AND CALCIUM CHLORIDE 1000 ML: .6; .31; .03; .02 INJECTION, SOLUTION INTRAVENOUS at 04:41

## 2021-08-14 NOTE — PLAN OF CARE
Problem: Nutrition/Hydration-ADULT  Goal: Nutrient/Hydration intake appropriate for improving, restoring or maintaining nutritional needs  Description: Monitor and assess patient's nutrition/hydration status for malnutrition  Collaborate with interdisciplinary team and initiate plan and interventions as ordered  Monitor patient's weight and dietary intake as ordered or per policy  Utilize nutrition screening tool and intervene as necessary  Determine patient's food preferences and provide high-protein, high-caloric foods as appropriate       INTERVENTIONS:  - Monitor oral intake, urinary output, labs, and treatment plans  - Assess nutrition and hydration status and recommend course of action  - Evaluate amount of meals eaten  - Assist patient with eating if necessary   - Allow adequate time for meals  - Recommend/ encourage appropriate diets, oral nutritional supplements, and vitamin/mineral supplements  - Order, calculate, and assess calorie counts as needed  - Recommend, monitor, and adjust tube feedings and TPN/PPN based on assessed needs  - Assess need for intravenous fluids  - Provide specific nutrition/hydration education as appropriate  - Include patient/family/caregiver in decisions related to nutrition  Outcome: Progressing     Problem: PAIN - ADULT  Goal: Verbalizes/displays adequate comfort level or baseline comfort level  Description: Interventions:  - Encourage patient to monitor pain and request assistance  - Assess pain using appropriate pain scale  - Administer analgesics based on type and severity of pain and evaluate response  - Implement non-pharmacological measures as appropriate and evaluate response  - Consider cultural and social influences on pain and pain management  - Notify physician/advanced practitioner if interventions unsuccessful or patient reports new pain  Outcome: Progressing     Problem: DISCHARGE PLANNING  Goal: Discharge to home or other facility with appropriate resources  Description: INTERVENTIONS:  - Identify barriers to discharge w/patient and caregiver  - Arrange for needed discharge resources and transportation as appropriate  - Identify discharge learning needs (meds, wound care, etc )  - Arrange for interpretive services to assist at discharge as needed  - Refer to Case Management Department for coordinating discharge planning if the patient needs post-hospital services based on physician/advanced practitioner order or complex needs related to functional status, cognitive ability, or social support system  Outcome: Progressing     Problem: Knowledge Deficit  Goal: Patient/family/caregiver demonstrates understanding of disease process, treatment plan, medications, and discharge instructions  Description: Complete learning assessment and assess knowledge base    Interventions:  - Provide teaching at level of understanding  - Provide teaching via preferred learning methods  Outcome: Progressing     Problem: GASTROINTESTINAL - ADULT  Goal: Minimal or absence of nausea and/or vomiting  Description: INTERVENTIONS:  - Administer IV fluids if ordered to ensure adequate hydration  - Maintain NPO status until nausea and vomiting are resolved  - Nasogastric tube if ordered  - Administer ordered antiemetic medications as needed  - Provide nonpharmacologic comfort measures as appropriate  - Advance diet as tolerated, if ordered  - Consider nutrition services referral to assist patient with adequate nutrition and appropriate food choices  Outcome: Progressing  Goal: Maintains or returns to baseline bowel function  Description: INTERVENTIONS:  - Assess bowel function  - Encourage oral fluids to ensure adequate hydration  - Administer IV fluids if ordered to ensure adequate hydration  - Administer ordered medications as needed  - Encourage mobilization and activity  - Consider nutritional services referral to assist patient with adequate nutrition and appropriate food choices  Outcome: Progressing  Goal: Maintains adequate nutritional intake  Description: INTERVENTIONS:  - Monitor percentage of each meal consumed  - Identify factors contributing to decreased intake, treat as appropriate  - Assist with meals as needed  - Monitor I&O, weight, and lab values if indicated  - Obtain nutrition services referral as needed  Outcome: Progressing  Goal: Establish and maintain optimal ostomy function  Description: INTERVENTIONS:  - Assess bowel function  - Encourage oral fluids to ensure adequate hydration  - Administer IV fluids if ordered to ensure adequate hydration   - Administer ordered medications as needed  - Encourage mobilization and activity  - Nutrition services referral to assist patient with appropriate food choices  - Assess stoma site  - Consider wound care consult   Outcome: Progressing

## 2021-08-14 NOTE — ED PROVIDER NOTES
History  Chief Complaint   Patient presents with    Abdominal Pain     continued abdominal pain, nausea, and vomiting  dx'd with gastroenteritis 3 days ago  History provided by:  Patient  Abdominal Pain  Pain location:  Generalized  Pain quality: cramping    Pain radiates to:  Does not radiate  Pain severity:  Moderate  Onset quality:  Gradual  Duration:  6 days  Timing:  Constant  Progression:  Worsening  Chronicity:  New  Context: sick contacts    Context: not recent travel and not suspicious food intake    Relieved by:  Nothing  Worsened by: Movement  Ineffective treatments:  Liquids, lying down and position changes  Associated symptoms: anorexia, diarrhea, fatigue, nausea and vomiting    Associated symptoms: no chest pain, no chills, no constipation, no cough, no dysuria, no fever and no shortness of breath       27year old F  Presents to the ED with diffuse abdominal pain, nausea/vomiting, diarrhea  The patient was evaluated in the ED and was diagnosed with gastroenteritis on 8/11  The patient's symptoms started 6 days ago  Having worsening abdominal pain, diarrhea over the last 2 days  Having multiple episodes per day  Has been having decreased oral intake  Describes her pain as cramping in nature and 7/10 in severity  Movement exacerbates her pain  Hasn't been taking anything for pain  Prior to Admission Medications   Prescriptions Last Dose Informant Patient Reported? Taking?    NON FORMULARY  Self Yes No   Sig: Med Name: Serenity Panning   NON FORMULARY  Self Yes No   Sig: Med Name: CBD, Vitex, Elderberry, Aloe vera, Lemon Balm,   NON FORMULARY  Self Yes No   Sig: Med Name: Echinacea purpurea   NON FORMULARY  Self Yes No   Sig: Med Name: licorice Root   NON FORMULARY  Self Yes No   Sig: Med Name: Rebecca Chicas   NON FORMULARY  Self Yes No   Sig: Med Name: Rhodiola Root   NON FORMULARY  Self Yes No   Sig: Med Name: Eleuthero Root   NON FORMULARY  Self Yes No   Sig: Med Name: D-Mannose   Probiotic Product (PROBIOTIC ADVANCED PO)  Self Yes No   Sig: Take by mouth   cimetidine (TAGAMET) 300 MG tablet  Self Yes No   Sig: Take 300 mg by mouth 2 (two) times a day   diazepam (VALIUM) 10 mg tablet  Self Yes No   Sig: Take 5 mg by mouth daily   lamoTRIgine (LaMICtal) 100 mg tablet  Self Yes No   Sig: Take 25 mg by mouth daily   lidocaine-prilocaine (EMLA) cream  Self Yes No   Sig: USE ONE APPLICATION TOPICALLY AS NEEDED FOR PAIN, USE AS DIRECTED   ondansetron (ZOFRAN-ODT) 4 mg disintegrating tablet   No No   Sig: Take 1 tablet (4 mg total) by mouth every 6 (six) hours as needed for nausea or vomiting      Facility-Administered Medications: None       Past Medical History:   Diagnosis Date    Anxiety     Arthritis     Bipolar 1 disorder (HCC)     Borderline personality disorder (HCC)     Calculus of GB/BD, acute cystis w/ obst     Chronic pain     Depression     Endometriosis     Fibromyalgia     History of stomach ulcers     Migraine     Pelvic floor dysfunction     PTSD (post-traumatic stress disorder)        Past Surgical History:   Procedure Laterality Date    ADENOIDECTOMY      APPENDECTOMY      TONSILLECTOMY         No family history on file  I have reviewed and agree with the history as documented  E-Cigarette/Vaping     E-Cigarette/Vaping Substances     Social History     Tobacco Use    Smoking status: Never Smoker    Smokeless tobacco: Never Used   Substance Use Topics    Alcohol use: Yes     Comment: once a year    Drug use: Yes     Types: Marijuana       Review of Systems   Constitutional: Positive for fatigue  Negative for chills and fever  HENT: Negative for congestion, rhinorrhea, sinus pressure and sinus pain  Eyes: Negative for pain and visual disturbance  Respiratory: Negative for cough, chest tightness and shortness of breath  Cardiovascular: Negative for chest pain and palpitations     Gastrointestinal: Positive for abdominal pain, anorexia, diarrhea, nausea and vomiting  Negative for constipation  Genitourinary: Negative for dysuria, flank pain and urgency  Musculoskeletal: Negative for back pain, joint swelling, neck pain and neck stiffness  Skin: Negative for color change and rash  Neurological: Negative for dizziness, weakness, light-headedness and headaches  Hematological: Does not bruise/bleed easily  Psychiatric/Behavioral: Positive for sleep disturbance  Negative for confusion  All other systems reviewed and are negative  Physical Exam  Physical Exam  Vitals and nursing note reviewed  Exam conducted with a chaperone present  Constitutional:       General: She is not in acute distress  Appearance: She is ill-appearing  She is not toxic-appearing  HENT:      Head: Normocephalic and atraumatic  Mouth/Throat:      Pharynx: Oropharynx is clear  Comments: Dry mucous membranes  Eyes:      Extraocular Movements: Extraocular movements intact  Pupils: Pupils are equal, round, and reactive to light  Cardiovascular:      Rate and Rhythm: Normal rate and regular rhythm  Heart sounds: Normal heart sounds  Pulmonary:      Effort: Pulmonary effort is normal       Breath sounds: Normal breath sounds  No wheezing, rhonchi or rales  Chest:      Chest wall: No tenderness  Abdominal:      General: Abdomen is flat  Bowel sounds are normal  There is no distension  Palpations: Abdomen is soft  Tenderness: There is generalized abdominal tenderness  There is guarding  There is no right CVA tenderness, left CVA tenderness or rebound  Negative signs include Carlton's sign  Hernia: No hernia is present  Skin:     General: Skin is warm and dry  Capillary Refill: Capillary refill takes less than 2 seconds  Neurological:      General: No focal deficit present  Mental Status: She is alert and oriented to person, place, and time  Cranial Nerves: No cranial nerve deficit  Motor: No weakness     Psychiatric: Mood and Affect: Mood normal  Mood is not anxious or depressed  Behavior: Behavior normal        Vital Signs  ED Triage Vitals [08/14/21 0328]   Temperature Pulse Respirations Blood Pressure SpO2   98 5 °F (36 9 °C) 85 14 119/76 98 %      Temp Source Heart Rate Source Patient Position - Orthostatic VS BP Location FiO2 (%)   Temporal -- Lying Right arm --      Pain Score       Worst Possible Pain           Vitals:    08/14/21 0328   BP: 119/76   Pulse: 85   Patient Position - Orthostatic VS: Lying     ED Medications  Medications   ondansetron (ZOFRAN) injection 4 mg (4 mg Intravenous Given 8/14/21 1557)   lactated ringers bolus 1,000 mL (0 mL Intravenous Stopped 8/14/21 0611)   ondansetron (ZOFRAN) injection 4 mg (4 mg Intravenous Given 8/14/21 0432)   dicyclomine (BENTYL) tablet 20 mg (20 mg Oral Given 8/14/21 0543)   potassium chloride (K-DUR,KLOR-CON) CR tablet 40 mEq (40 mEq Oral Given 8/14/21 0544)     Diagnostic Studies  Results Reviewed     Procedure Component Value Units Date/Time    Manual Differential(PHLEBS Do Not Order) [[id number]]  (Abnormal) Collected: 08/14/21 0431    Lab Status: Final result Specimen: Blood from Arm, Left Updated: 08/14/21 0651     Segmented % 44 %      Bands % 4 %      Lymphocytes % 32 %      Monocytes % 10 %      Eosinophils, % 2 %      Basophils % 0 %      Atypical Lymphocytes % 8 %      Absolute Neutrophils 1 95 Thousand/uL      Lymphocytes Absolute 1 30 Thousand/uL      Monocytes Absolute 0 41 Thousand/uL      Eosinophils Absolute 0 08 Thousand/uL      Basophils Absolute 0 00 Thousand/uL      Total Counted 100     Platelet Estimate Adequate    Stool Enteric Bacterial Panel by PCR [[id number]] Collected: 08/14/21 0541    Lab Status: In process Specimen: Stool from Per Rectum Updated: 08/14/21 0635    Clostridium difficile toxin by PCR with EIA [[id number]] Collected: 08/14/21 0540    Lab Status:  In process Specimen: Stool from Per Rectum Updated: 08/14/21 0543 Comprehensive metabolic panel [[id number]]  (Abnormal) Collected: 08/14/21 0431    Lab Status: Final result Specimen: Blood from Arm, Left Updated: 08/14/21 0518     Sodium 136 mmol/L      Potassium 3 3 mmol/L      Chloride 100 mmol/L      CO2 21 mmol/L      ANION GAP 15 mmol/L      BUN 7 mg/dL      Creatinine 0 75 mg/dL      Glucose 81 mg/dL      Calcium 8 3 mg/dL      AST 33 U/L      ALT 25 U/L      Alkaline Phosphatase 51 U/L      Total Protein 7 2 g/dL      Albumin 3 6 g/dL      Total Bilirubin 0 38 mg/dL      eGFR 109 ml/min/1 73sq m     Narrative:      [place] guidelines for Chronic Kidney Disease (CKD):     Stage 1 with normal or high GFR (GFR > 90 mL/min/1 73 square meters)    Stage 2 Mild CKD (GFR = 60-89 mL/min/1 73 square meters)    Stage 3A Moderate CKD (GFR = 45-59 mL/min/1 73 square meters)    Stage 3B Moderate CKD (GFR = 30-44 mL/min/1 73 square meters)    Stage 4 Severe CKD (GFR = 15-29 mL/min/1 73 square meters)    Stage 5 End Stage CKD (GFR <15 mL/min/1 73 square meters)  Note: GFR calculation is accurate only with a steady state creatinine    Lipase [[id number]]  (Normal) Collected: 08/14/21 0431    Lab Status: Final result Specimen: Blood from Arm, Left Updated: 08/14/21 0518     Lipase 92 u/L     Magnesium [[id number]]  (Normal) Collected: 08/14/21 0431    Lab Status: Final result Specimen: Blood from Arm, Left Updated: 08/14/21 0518     Magnesium 1 7 mg/dL     Phosphorus [[id number]]  (Normal) Collected: 08/14/21 0431    Lab Status: Final result Specimen: Blood from Arm, Left Updated: 08/14/21 0518     Phosphorus 3 1 mg/dL     Lactic acid, plasma [[id number]]  (Normal) Collected: 08/14/21 0431    Lab Status: Final result Specimen: Blood from Arm, Left Updated: 08/14/21 0512     LACTIC ACID 0 8 mmol/L     Narrative:      Result may be elevated if tourniquet was used during collection      CBC and differential [[id number]]  (Abnormal) Collected: 08/14/21 0431    Lab Status: Final result Specimen: Blood from Arm, Left Updated: 08/14/21 0504     WBC 4 06 Thousand/uL      RBC 4 78 Million/uL      Hemoglobin 14 5 g/dL      Hematocrit 42 0 %      MCV 88 fL      MCH 30 3 pg      MCHC 34 5 g/dL      RDW 12 6 %      MPV 11 6 fL      Platelets 142 Thousands/uL     Narrative: This is an appended report  These results have been appended to a previously verified report  CT abdomen pelvis wo contrast   Final Result by Christina Ramirez MD (08/14 5822)      No acute intra-abdominal abnormality  No free air or free fluid  Nondilated fluid-filled small bowel are noted throughout the abdomen or pelvis which may be related to a mild gastroenteritis  No large or small bowel obstruction  Workstation performed: [id number]                Procedures  Procedures     ED Course     MDM     78-year-old female  Presents to the emergency department with a diffuse abdominal pain, worsening gastroenteritis  Was evaluated in the ED for abdominal pain, N/V/D  Work up was largely unremarkable  Was administered a dose of IV Zofran, Toradol and IVF  +diffuse abdominal pain on exam  Mild dehydration--hypokalemia  Renal function normal  CT consistent with gastroenteritis  The patient was administered Zofran, Bentyl  Continues to have abdominal pain and inability to tolerate PO  Admitted to IM  The patient was stable while under my care       Disposition  Final diagnoses:   Gastroenteritis   Intractable vomiting with nausea, unspecified vomiting type   Diffuse abdominal pain     Time reflects when diagnosis was documented in both MDM as applicable and the Disposition within this note     Time User Action Codes Description Comment    8/14/2021  6:59 AM Graciella Gisella [K52 9] Gastroenteritis     8/14/2021  6:59 AM Gayleen Osler Add [R11 2] Intractable vomiting with nausea, unspecified vomiting type     8/14/2021  7:00 AM Gayleen Osler Add [R10 84] Diffuse abdominal pain ED Disposition     ED Disposition Condition Date/Time Comment    Admit Stable Sat Aug 14, 2021  6:59 AM Case was discussed with Te[facility] Service and the patient's admission status was agreed to be Admission Status: observation status to the service of Dr Parul Pollard   Follow-up Information    None         Patient's Medications   Discharge Prescriptions    No medications on file     No discharge procedures on file      PDMP Review     None          ED Provider  Electronically Signed by           Isabella Cummings DO  08/14/21 4551

## 2021-08-14 NOTE — H&P
Candy 1995, 27 y o  female MRN: [id number]  Unit/Bed#: ED 10 Encounter: [id number]  Primary Care Provider: Marissa Gross MD   Date and time admitted to hospital: 8/14/2021  3:21 AM    * Gastroenteritis  Assessment & Plan  · Abdominal cramping at diarrhea for 6 days, diagnosed with gastroenteritis 3 days ago  · CT abdomen pelvis: No acute intra-abdominal abnormality  No free air or free fluid  Nondilated fluid-filled small bowel are noted throughout the abdomen or pelvis which may be related to a mild gastroenteritis  No large or small bowel obstruction  · NPO-advance diet as tolerated  · Normal saline at 75 mL/hour  · Supportive care  · Check enteric panel        Hypokalemia  Assessment & Plan  · Potassium is 3 3  · 40 mEq given orally in the ER  · Recheck metabolic panel and trend potassium    Rash  Assessment & Plan  · Rash to left arm 4 cm round, red, crusting with small area of erythema to right flank and left chest  · Serial exams, monitor for worsening    Psychiatric illness  Assessment & Plan  · History of PSTD, bipolar 1 disorder, borderline personality disorder  · She is not on prescription meds-she takes supplements including Ashwaganda  · Supportive care    VTE Prophylaxis: Pharmacologic VTE Prophylaxis contraindicated due to VTE score 1  / reason for no mechanical VTE prophylaxis VTE score 1   Code Status:  Full  POLST: POLST is not applicable to this patient  Discussion with family:  Patient    Anticipated Length of Stay:  Patient will be admitted on an Observation basis with an anticipated length of stay of  less than 2 midnights  Justification for Hospital Stay:  Per plan above    Total Time for Visit, including Counseling / Coordination of Care: 30 minutes  Greater than 50% of this total time spent on direct patient counseling and coordination of care      Chief Complaint:   Abdominal pain    History of Present Illness:    Early Greta Zarina Arriaga is a 27 y o  female with history of bipolar disorder borderline personality disorder cholelithiasis, fibromyalgia, gastric ulcers, migraine, PTSD who presents with abdominal pain  She was diagnosed with gastroenteritis 3 days ago and is having difficulty tolerating oral intake  She is also having diarrhea  She says her pain is severe, cramping, constant, and worsened by movement  She has seen numerous gastroenterologist in the past for diarrhea and weight loss, however she states that no one has been able to identify the cause  She denies any travel, camping, suspicious food exposure, or animal exposure  She denies fever or chills, dysphagia, cough, chest pain, dysuria, flank pain, back pain, myalgias, or focal weakness  Review of Systems:    Review of Systems   Constitutional: Negative for chills and fever  HENT: Positive for congestion  Respiratory: Negative for cough and shortness of breath  Cardiovascular: Negative for chest pain, palpitations and leg swelling  Gastrointestinal: Positive for abdominal pain, diarrhea and nausea  Negative for abdominal distention, constipation and vomiting  Genitourinary: Negative for dysuria and frequency  Musculoskeletal: Negative for arthralgias and myalgias  Skin: Positive for rash  Neurological: Negative for dizziness, syncope, weakness and headaches  Psychiatric/Behavioral: Positive for dysphoric mood  Negative for suicidal ideas  All other systems reviewed and are negative        Past Medical and Surgical History:     Past Medical History:   Diagnosis Date    Anxiety     Arthritis     Bipolar 1 disorder ([facility] )     Borderline personality disorder ([facility] )     Calculus of GB/BD, acute cystis w/ obst     Chronic pain     Depression     Endometriosis     Fibromyalgia     History of stomach ulcers     Migraine     Pelvic floor dysfunction     PTSD (post-traumatic stress disorder)        Past Surgical History:   Procedure Laterality Date  ADENOIDECTOMY      APPENDECTOMY      TONSILLECTOMY         Meds/Allergies:    Prior to Admission medications    Medication Sig Start Date End Date Taking? Authorizing Provider   cimetidine (TAGAMET) 300 MG tablet Take 300 mg by mouth 2 (two) times a day    Historical Provider, MD   diazepam (VALIUM) 10 mg tablet Take 5 mg by mouth daily    Historical Provider, MD   lamoTRIgine (LaMICtal) 100 mg tablet Take 25 mg by mouth daily    Historical Provider, MD   lidocaine-prilocaine (EMLA) cream USE ONE APPLICATION TOPICALLY AS NEEDED FOR PAIN, USE AS DIRECTED 2/7/19   Historical Provider, MD   NON FORMULARY Med Name: Enrico Sung    Historical Provider, MD   NON FORMULARY Med Name: CBD, Vitex, Elderberry, Aloe vera, Geoffery Double,    Historical Provider, MD   NON FORMULARY Med Name: Echinacea purpurea    Historical Provider, MD   NON FORMULARY Med Name: licorice Root    Historical Provider, MD   [address] [place] [address] Name: Judit Knox Provider, MD   [address] [place] [address] Name: Rhodiola Root    Historical Provider, MD   NON FORMULARY Med Name: Eleuthero Root    Historical Provider, MD   NON FORMULARY Med Name: D-Mannose    Historical Provider, MD   ondansetron (ZOFRAN-ODT) 4 mg disintegrating tablet Take 1 tablet (4 mg total) by mouth every 6 (six) hours as needed for nausea or vomiting 8/11/21   Russel Worrell,    Probiotic Product (PROBIOTIC ADVANCED PO) Take by mouth    Historical Provider, MD     I have reviewed home medications with patient personally  Allergies: Allergies   Allergen Reactions    Amoxicillin Itching and Shortness Of Breath     Other reaction(s):  Other (Please comment)  S  OB, rapid heart rate, chest pain      Atomoxetine Anaphylaxis and Swelling     Other reaction(s): Edema airway    Iodinated Diagnostic Agents Hives    Metoclopramide Anaphylaxis, Edema, Shortness Of Breath and Swelling     Other reaction(s): Edema face/lips/tongue  Twitching and throat closing  hives      Progesterone Shortness Of Breath and Chest Pain    Guaifenesin      Other reaction(s): Other (See Comments)  Felt very depressed     Fish Oil - Food Allergy Itching    Iodine Solution [Povidone Iodine] Hives       Social History:     Marital Status:    Occupation:  Disabled  Patient Pre-hospital Living Situation:  Home  Patient Pre-hospital Level of Mobility:  Independent  Patient Pre-hospital Diet Restrictions:  None  Substance Use History:   Social History     Substance and Sexual Activity   Alcohol Use Yes    Comment: once a year     Social History     Tobacco Use   Smoking Status Never Smoker   Smokeless Tobacco Never Used     Social History     Substance and Sexual Activity   Drug Use Yes    Types: Marijuana       Family History:    non-contributory    Physical Exam:     Vitals:   Blood Pressure: 111/55 (08/14/21 0612)  Pulse: 88 (08/14/21 0612)  Temperature: 98 5 °F (36 9 °C) (08/14/21 0328)  Temp Source: Temporal (08/14/21 0328)  Respirations: 16 (08/14/21 0612)  Weight - Scale: 49 7 kg (109 lb 9 1 oz) (08/14/21 0328)  SpO2: 98 % (08/14/21 0612)    Physical Exam  Vitals and nursing note reviewed  Constitutional:       Appearance: She is underweight  HENT:      Head: Normocephalic and atraumatic  Mouth/Throat:      Mouth: Mucous membranes are moist       Pharynx: Oropharynx is clear  Eyes:      Extraocular Movements: Extraocular movements intact  Pupils: Pupils are equal, round, and reactive to light  Cardiovascular:      Rate and Rhythm: Normal rate and regular rhythm  Pulses: Normal pulses  Heart sounds: Normal heart sounds  Pulmonary:      Effort: Pulmonary effort is normal       Breath sounds: Normal breath sounds  Abdominal:      General: Abdomen is flat  Bowel sounds are normal       Palpations: Abdomen is soft  Musculoskeletal:         General: Normal range of motion  Cervical back: Normal range of motion and neck supple     Skin:     General: Skin is warm and dry  Capillary Refill: Capillary refill takes less than 2 seconds  Findings: Rash present  Neurological:      General: No focal deficit present  Mental Status: She is alert and oriented to person, place, and time  Additional Data:     Lab Results: I have personally reviewed pertinent reports  Results from last 7 days   Lab Units 08/14/21  0431 08/11/21  1836   WBC Thousand/uL 4 06* 4 99   HEMOGLOBIN g/dL 14 5 15 6*   HEMATOCRIT % 42 0 47 0*   PLATELETS Thousands/uL 202 237   BANDS PCT % 4  --    NEUTROS PCT %  --  68   LYMPHS PCT %  --  18   LYMPHO PCT % 32  --    MONOS PCT %  --  12   MONO PCT % 10  --    EOS PCT % 2 2     Results from last 7 days   Lab Units 08/14/21  0431   SODIUM mmol/L 136   POTASSIUM mmol/L 3 3*   CHLORIDE mmol/L 100   CO2 mmol/L 21   BUN mg/dL 7   CREATININE mg/dL 0 75   ANION GAP mmol/L 15*   CALCIUM mg/dL 8 3   ALBUMIN g/dL 3 6   TOTAL BILIRUBIN mg/dL 0 38   ALK PHOS U/L 51   ALT U/L 25   AST U/L 33   GLUCOSE RANDOM mg/dL 81                 Results from last 7 days   Lab Units 08/14/21  0431   LACTIC ACID mmol/L 0 8       Imaging: I have personally reviewed pertinent reports  CT abdomen pelvis wo contrast   Final Result by Garret Azul MD (08/14 7284)      No acute intra-abdominal abnormality  No free air or free fluid  Nondilated fluid-filled small bowel are noted throughout the abdomen or pelvis which may be related to a mild gastroenteritis  No large or small bowel obstruction  Workstation performed: [id number]           [employer] / [employer] Records Reviewed: Yes     ** Please Note: This note has been constructed using a voice recognition system   **

## 2021-08-14 NOTE — ASSESSMENT & PLAN NOTE
· History of PSTD, bipolar 1 disorder, borderline personality disorder  · She is not on prescription meds-she takes supplements including Ashwaganda  · Supportive care

## 2021-08-14 NOTE — ASSESSMENT & PLAN NOTE
· Abdominal cramping at diarrhea for 6 days, diagnosed with gastroenteritis 3 days ago  · CT abdomen pelvis: No acute intra-abdominal abnormality  No free air or free fluid  Nondilated fluid-filled small bowel are noted throughout the abdomen or pelvis which may be related to a mild gastroenteritis    No large or small bowel obstruction  · NPO-advance diet as tolerated  · Normal saline at 75 mL/hour  · Supportive care  · Check enteric panel

## 2021-08-15 LAB
ANION GAP SERPL CALCULATED.3IONS-SCNC: 13 MMOL/L (ref 4–13)
BUN SERPL-MCNC: 3 MG/DL (ref 5–25)
C DIFF TOX B TCDB STL QL NAA+PROBE: NEGATIVE
CALCIUM SERPL-MCNC: 8.5 MG/DL (ref 8.3–10.1)
CAMPYLOBACTER DNA SPEC NAA+PROBE: NORMAL
CHLORIDE SERPL-SCNC: 109 MMOL/L (ref 100–108)
CO2 SERPL-SCNC: 19 MMOL/L (ref 21–32)
CREAT SERPL-MCNC: 0.8 MG/DL (ref 0.6–1.3)
ERYTHROCYTE [DISTWIDTH] IN BLOOD BY AUTOMATED COUNT: 12.9 % (ref 11.6–15.1)
GFR SERPL CREATININE-BSD FRML MDRD: 101 ML/MIN/1.73SQ M
GLUCOSE P FAST SERPL-MCNC: 74 MG/DL (ref 65–99)
GLUCOSE SERPL-MCNC: 74 MG/DL (ref 65–140)
HCT VFR BLD AUTO: 40.7 % (ref 34.8–46.1)
HGB BLD-MCNC: 13.4 G/DL (ref 11.5–15.4)
MCH RBC QN AUTO: 29.5 PG (ref 26.8–34.3)
MCHC RBC AUTO-ENTMCNC: 32.9 G/DL (ref 31.4–37.4)
MCV RBC AUTO: 90 FL (ref 82–98)
PLATELET # BLD AUTO: 187 THOUSANDS/UL (ref 149–390)
PMV BLD AUTO: 11.4 FL (ref 8.9–12.7)
POTASSIUM SERPL-SCNC: 3.8 MMOL/L (ref 3.5–5.3)
RBC # BLD AUTO: 4.54 MILLION/UL (ref 3.81–5.12)
SALMONELLA DNA SPEC QL NAA+PROBE: NORMAL
SHIGA TOXIN STX GENE SPEC NAA+PROBE: NORMAL
SHIGELLA DNA SPEC QL NAA+PROBE: NORMAL
SODIUM SERPL-SCNC: 141 MMOL/L (ref 136–145)
WBC # BLD AUTO: 3.48 THOUSAND/UL (ref 4.31–10.16)

## 2021-08-15 PROCEDURE — 85027 COMPLETE CBC AUTOMATED: CPT | Performed by: FAMILY MEDICINE

## 2021-08-15 PROCEDURE — 99232 SBSQ HOSP IP/OBS MODERATE 35: CPT | Performed by: FAMILY MEDICINE

## 2021-08-15 PROCEDURE — 80048 BASIC METABOLIC PNL TOTAL CA: CPT | Performed by: NURSE PRACTITIONER

## 2021-08-15 RX ORDER — SODIUM CHLORIDE, SODIUM LACTATE, POTASSIUM CHLORIDE, CALCIUM CHLORIDE 600; 310; 30; 20 MG/100ML; MG/100ML; MG/100ML; MG/100ML
100 INJECTION, SOLUTION INTRAVENOUS CONTINUOUS
Status: DISCONTINUED | OUTPATIENT
Start: 2021-08-15 | End: 2021-08-16 | Stop reason: HOSPADM

## 2021-08-15 RX ADMIN — SODIUM CHLORIDE, SODIUM LACTATE, POTASSIUM CHLORIDE, AND CALCIUM CHLORIDE 100 ML/HR: .6; .31; .03; .02 INJECTION, SOLUTION INTRAVENOUS at 12:17

## 2021-08-15 RX ADMIN — SODIUM CHLORIDE 75 ML/HR: 0.9 INJECTION, SOLUTION INTRAVENOUS at 08:40

## 2021-08-15 RX ADMIN — HYDROCORTISONE: 1 CREAM TOPICAL at 08:40

## 2021-08-15 RX ADMIN — ONDANSETRON 4 MG: 2 INJECTION INTRAMUSCULAR; INTRAVENOUS at 05:43

## 2021-08-15 RX ADMIN — HYDROCORTISONE: 1 CREAM TOPICAL at 21:48

## 2021-08-15 NOTE — PROGRESS NOTES
[address]  Progress Note - Tracie Rosado 1995, 27 y o  female MRN: [id number]  Unit/Bed#: -[facility] Encounter: [id number]  Primary Care Provider: Lisette James MD   Date and time admitted to hospital: 8/14/2021  3:21 AM    * Gastroenteritis  Assessment & Plan  · Abdominal cramping at diarrhea for 6 days, diagnosed with gastroenteritis 3 days ago  · CT abdomen pelvis: No acute intra-abdominal abnormality  No free air or free fluid  Nondilated fluid-filled small bowel are noted throughout the abdomen or pelvis which may be related to a mild gastroenteritis  No large or small bowel obstruction  · Normal saline at 75 mL/hour  · Supportive care  · C diff negative, Giardia and enteric panel pending  · Mild metabolic acidosis noted today despite IV fluid hydration due to persistent diarrhea  · Patient was reporting that she is having close to 20 loose liquidy bowel movements today initially there for greenish color and now they are yellowish color  Also complaining of increasing abdominal cramping today  Will keep NPO after midnight and consult placed to GI and converted to a full admission due to intractable diarrhea and abdominal pain        Rash  Assessment & Plan  · Rash to left arm 4 cm round, red, crusting with small area of erythema to right flank and left chest  · Serial exams, monitor for worsening    Placed on hydrocortisone topical ointment    Psychiatric illness  Assessment & Plan  · History of PSTD, bipolar 1 disorder, borderline personality disorder  · She is not on prescription meds-she takes supplements including Ashwaganda  · Supportive care    Hypokalemia  Assessment & Plan  · Potassium is 3 8 today  · Continue IV fluid hydration and recheck BMP tomorrow      VTE Pharmacologic Prophylaxis:   Pharmacologic: Pharmacologic VTE Prophylaxis contraindicated due to Low risk  Mechanical VTE Prophylaxis in Place: Yes    Patient Centered Rounds: I have performed bedside rounds with nursing staff today  Discussions with Specialists or Other Care Team Provider:  None    Education and Discussions with Family / Patient:  Discussed with patient at bedside    Time Spent for Care: 30 minutes  More than 50% of total time spent on counseling and coordination of care as described above  Current Length of Stay: 0 day(s)    Current Patient Status: Inpatient   Certification Statement: The patient will continue to require additional inpatient hospital stay due to Intractable diarrhea    Discharge Plan:  Noted to full admission  Continue IV fluid hydration keep her on of full liquid diet for now and NPO after midnight and GI eval tomorrow    Code Status: Level 1 - Full Code      Subjective:   Patient complains of increasing abdominal cramping today  Yesterday she states she feels a little bit better with the pain but today the pain is back  Had almost 20 loose watery stools yesterday initially they were greenish in color and now they are light yellow  Objective:     Vitals:   Temp (24hrs), Av 9 °F (36 6 °C), Min:97 6 °F (36 4 °C), Max:98 2 °F (36 8 °C)    Temp:  [97 6 °F (36 4 °C)-98 2 °F (36 8 °C)] 97 6 °F (36 4 °C)  HR:  [70-81] 70  Resp:  [16-18] 18  BP: (104-109)/(59-70) 108/59  SpO2:  [96 %-98 %] 98 %  Body mass index is 18 23 kg/m²  Input and Output Summary (last 24 hours): Intake/Output Summary (Last 24 hours) at 8/15/2021 1155  Last data filed at 2021 2300  Gross per 24 hour   Intake 1193 75 ml   Output --   Net 1193 75 ml       Physical Exam:     Physical Exam  Vitals and nursing note reviewed  Constitutional:       Appearance: Normal appearance  HENT:      Head: Normocephalic and atraumatic  Right Ear: External ear normal       Left Ear: External ear normal       Nose: Nose normal       Mouth/Throat:      Pharynx: Oropharynx is clear  Eyes:      Pupils: Pupils are equal, round, and reactive to light     Cardiovascular:      Rate and Rhythm: Normal rate and regular rhythm  Heart sounds: Normal heart sounds  Pulmonary:      Effort: Pulmonary effort is normal       Breath sounds: Normal breath sounds  Abdominal:      General: Bowel sounds are normal       Palpations: Abdomen is soft  Tenderness: There is abdominal tenderness  Musculoskeletal:         General: Normal range of motion  Cervical back: Normal range of motion and neck supple  Skin:     General: Skin is warm and dry  Capillary Refill: Capillary refill takes less than 2 seconds  Neurological:      General: No focal deficit present  Mental Status: She is alert and oriented to person, place, and time  Psychiatric:      Comments: Anxious           Additional Data:     Labs:    Results from last 7 days   Lab Units 08/15/21  0535 08/14/21  0431 08/11/21  1836   WBC Thousand/uL 3 48* 4 06* 4 99   HEMOGLOBIN g/dL 13 4 14 5 15 6*   HEMATOCRIT % 40 7 42 0 47 0*   PLATELETS Thousands/uL 187 202 237   BANDS PCT %  --  4  --    NEUTROS PCT %  --   --  68   LYMPHS PCT %  --   --  18   LYMPHO PCT %  --  32  --    MONOS PCT %  --   --  12   MONO PCT %  --  10  --    EOS PCT %  --  2 2     Results from last 7 days   Lab Units 08/15/21  0535 08/14/21  0431   SODIUM mmol/L 141 136   POTASSIUM mmol/L 3 8 3 3*   CHLORIDE mmol/L 109* 100   CO2 mmol/L 19* 21   BUN mg/dL 3* 7   CREATININE mg/dL 0 80 0 75   ANION GAP mmol/L 13 15*   CALCIUM mg/dL 8 5 8 3   ALBUMIN g/dL  --  3 6   TOTAL BILIRUBIN mg/dL  --  0 38   ALK PHOS U/L  --  51   ALT U/L  --  25   AST U/L  --  33   GLUCOSE RANDOM mg/dL 74 81                 Results from last 7 days   Lab Units 08/14/21  0431   LACTIC ACID mmol/L 0 8           * I Have Reviewed All Lab Data Listed Above  * Additional Pertinent Lab Tests Reviewed:  Gwen 66 Admission Reviewed    Imaging:    Imaging Reports Reviewed Today Include:  None  Imaging Personally Reviewed by Myself Includes:  None    Recent Cultures (last 7 days): Results from last 7 days   Lab Units 08/14/21  0540   C DIFF TOXIN B BY PCR  Negative       Last 24 Hours Medication List:   Current Facility-Administered Medications   Medication Dose Route Frequency Provider Last Rate    hydrocortisone   Topical TID Saurav Chang MD      lactated ringers  100 mL/hr Intravenous Continuous Saurav Chang MD      ondansetron  4 mg Intravenous Q6H PRN RADHAMES Curran          Today, Patient Was Seen By: Saurav Chang MD    ** Please Note: Dictation voice to text software may have been used in the creation of this document   **

## 2021-08-15 NOTE — PLAN OF CARE
Problem: Nutrition/Hydration-ADULT  Goal: Nutrient/Hydration intake appropriate for improving, restoring or maintaining nutritional needs  Description: Monitor and assess patient's nutrition/hydration status for malnutrition  Collaborate with interdisciplinary team and initiate plan and interventions as ordered  Monitor patient's weight and dietary intake as ordered or per policy  Utilize nutrition screening tool and intervene as necessary  Determine patient's food preferences and provide high-protein, high-caloric foods as appropriate       INTERVENTIONS:  - Monitor oral intake, urinary output, labs, and treatment plans  - Assess nutrition and hydration status and recommend course of action  - Evaluate amount of meals eaten  - Assist patient with eating if necessary   - Allow adequate time for meals  - Recommend/ encourage appropriate diets, oral nutritional supplements, and vitamin/mineral supplements  - Order, calculate, and assess calorie counts as needed  - Recommend, monitor, and adjust tube feedings and TPN/PPN based on assessed needs  - Assess need for intravenous fluids  - Provide specific nutrition/hydration education as appropriate  - Include patient/family/caregiver in decisions related to nutrition  Outcome: Progressing     Problem: PAIN - ADULT  Goal: Verbalizes/displays adequate comfort level or baseline comfort level  Description: Interventions:  - Encourage patient to monitor pain and request assistance  - Assess pain using appropriate pain scale  - Administer analgesics based on type and severity of pain and evaluate response  - Implement non-pharmacological measures as appropriate and evaluate response  - Consider cultural and social influences on pain and pain management  - Notify physician/advanced practitioner if interventions unsuccessful or patient reports new pain  Outcome: Progressing     Problem: DISCHARGE PLANNING  Goal: Discharge to home or other facility with appropriate resources  Description: INTERVENTIONS:  - Identify barriers to discharge w/patient and caregiver  - Arrange for needed discharge resources and transportation as appropriate  - Identify discharge learning needs (meds, wound care, etc )  - Arrange for interpretive services to assist at discharge as needed  - Refer to Case Management Department for coordinating discharge planning if the patient needs post-hospital services based on physician/advanced practitioner order or complex needs related to functional status, cognitive ability, or social support system  Outcome: Progressing     Problem: Knowledge Deficit  Goal: Patient/family/caregiver demonstrates understanding of disease process, treatment plan, medications, and discharge instructions  Description: Complete learning assessment and assess knowledge base    Interventions:  - Provide teaching at level of understanding  - Provide teaching via preferred learning methods  Outcome: Progressing     Problem: GASTROINTESTINAL - ADULT  Goal: Minimal or absence of nausea and/or vomiting  Description: INTERVENTIONS:  - Administer IV fluids if ordered to ensure adequate hydration  - Maintain NPO status until nausea and vomiting are resolved  - Nasogastric tube if ordered  - Administer ordered antiemetic medications as needed  - Provide nonpharmacologic comfort measures as appropriate  - Advance diet as tolerated, if ordered  - Consider nutrition services referral to assist patient with adequate nutrition and appropriate food choices  Outcome: Progressing  Goal: Maintains or returns to baseline bowel function  Description: INTERVENTIONS:  - Assess bowel function  - Encourage oral fluids to ensure adequate hydration  - Administer IV fluids if ordered to ensure adequate hydration  - Administer ordered medications as needed  - Encourage mobilization and activity  - Consider nutritional services referral to assist patient with adequate nutrition and appropriate food choices  Outcome: Progressing  Goal: Maintains adequate nutritional intake  Description: INTERVENTIONS:  - Monitor percentage of each meal consumed  - Identify factors contributing to decreased intake, treat as appropriate  - Assist with meals as needed  - Monitor I&O, weight, and lab values if indicated  - Obtain nutrition services referral as needed  Outcome: Progressing  Goal: Establish and maintain optimal ostomy function  Description: INTERVENTIONS:  - Assess bowel function  - Encourage oral fluids to ensure adequate hydration  - Administer IV fluids if ordered to ensure adequate hydration   - Administer ordered medications as needed  - Encourage mobilization and activity  - Nutrition services referral to assist patient with appropriate food choices  - Assess stoma site  - Consider wound care consult   Outcome: Progressing     Problem: Potential for Falls  Goal: Patient will remain free of falls  Description: INTERVENTIONS:  - Educate patient/family on patient safety including physical limitations  - Instruct patient to call for assistance with activity   - Consult OT/PT to assist with strengthening/mobility   - Keep Call bell within reach  - Keep bed low and locked with side rails adjusted as appropriate  - Keep care items and personal belongings within reach  - Initiate and maintain comfort rounds  - Make Fall Risk Sign visible to staff  - Apply yellow socks and bracelet for high fall risk patients  - Consider moving patient to room near nurses station  Outcome: Progressing

## 2021-08-15 NOTE — ASSESSMENT & PLAN NOTE
· Rash to left arm 4 cm round, red, crusting with small area of erythema to right flank and left chest  · Serial exams, monitor for worsening    Placed on hydrocortisone topical ointment

## 2021-08-15 NOTE — ASSESSMENT & PLAN NOTE
· Abdominal cramping at diarrhea for 6 days, diagnosed with gastroenteritis 3 days ago  · CT abdomen pelvis: No acute intra-abdominal abnormality  No free air or free fluid  Nondilated fluid-filled small bowel are noted throughout the abdomen or pelvis which may be related to a mild gastroenteritis  No large or small bowel obstruction  · Normal saline at 75 mL/hour  · Supportive care  · C diff negative, Giardia and enteric panel pending  · Mild metabolic acidosis noted today despite IV fluid hydration due to persistent diarrhea  · Patient was reporting that she is having close to 20 loose liquidy bowel movements today initially there for greenish color and now they are yellowish color  Also complaining of increasing abdominal cramping today    Will keep NPO after midnight and consult placed to GI and converted to a full admission due to intractable diarrhea and abdominal pain

## 2021-08-16 VITALS
HEIGHT: 65 IN | RESPIRATION RATE: 17 BRPM | DIASTOLIC BLOOD PRESSURE: 69 MMHG | WEIGHT: 109.57 LBS | TEMPERATURE: 97.6 F | BODY MASS INDEX: 18.26 KG/M2 | SYSTOLIC BLOOD PRESSURE: 111 MMHG | OXYGEN SATURATION: 95 % | HEART RATE: 92 BPM

## 2021-08-16 LAB
ANION GAP SERPL CALCULATED.3IONS-SCNC: 12 MMOL/L (ref 4–13)
BUN SERPL-MCNC: 1 MG/DL (ref 5–25)
CALCIUM SERPL-MCNC: 8.8 MG/DL (ref 8.3–10.1)
CHLORIDE SERPL-SCNC: 105 MMOL/L (ref 100–108)
CO2 SERPL-SCNC: 24 MMOL/L (ref 21–32)
CREAT SERPL-MCNC: 0.76 MG/DL (ref 0.6–1.3)
ERYTHROCYTE [DISTWIDTH] IN BLOOD BY AUTOMATED COUNT: 13.2 % (ref 11.6–15.1)
G LAMBLIA AG STL QL IA: NEGATIVE
GFR SERPL CREATININE-BSD FRML MDRD: 107 ML/MIN/1.73SQ M
GLUCOSE SERPL-MCNC: 82 MG/DL (ref 65–140)
HCT VFR BLD AUTO: 40.8 % (ref 34.8–46.1)
HGB BLD-MCNC: 14 G/DL (ref 11.5–15.4)
MCH RBC QN AUTO: 30.5 PG (ref 26.8–34.3)
MCHC RBC AUTO-ENTMCNC: 34.3 G/DL (ref 31.4–37.4)
MCV RBC AUTO: 89 FL (ref 82–98)
PLATELET # BLD AUTO: 187 THOUSANDS/UL (ref 149–390)
PMV BLD AUTO: 11.1 FL (ref 8.9–12.7)
POTASSIUM SERPL-SCNC: 3.3 MMOL/L (ref 3.5–5.3)
PROCALCITONIN SERPL-MCNC: <0.05 NG/ML
RBC # BLD AUTO: 4.59 MILLION/UL (ref 3.81–5.12)
SODIUM SERPL-SCNC: 141 MMOL/L (ref 136–145)
WBC # BLD AUTO: 3.31 THOUSAND/UL (ref 4.31–10.16)

## 2021-08-16 PROCEDURE — 99239 HOSP IP/OBS DSCHRG MGMT >30: CPT | Performed by: FAMILY MEDICINE

## 2021-08-16 PROCEDURE — 80048 BASIC METABOLIC PNL TOTAL CA: CPT | Performed by: FAMILY MEDICINE

## 2021-08-16 PROCEDURE — 84145 PROCALCITONIN (PCT): CPT | Performed by: FAMILY MEDICINE

## 2021-08-16 PROCEDURE — 85027 COMPLETE CBC AUTOMATED: CPT | Performed by: FAMILY MEDICINE

## 2021-08-16 PROCEDURE — RECHECK: Performed by: FAMILY MEDICINE

## 2021-08-16 RX ORDER — DIAPER,BRIEF,INFANT-TODD,DISP
EACH MISCELLANEOUS 3 TIMES DAILY
Qty: 30 G | Refills: 0 | Status: SHIPPED | OUTPATIENT
Start: 2021-08-16

## 2021-08-16 RX ORDER — POTASSIUM CHLORIDE 14.9 MG/ML
20 INJECTION INTRAVENOUS ONCE
Status: COMPLETED | OUTPATIENT
Start: 2021-08-16 | End: 2021-08-16

## 2021-08-16 RX ADMIN — HYDROCORTISONE: 1 CREAM TOPICAL at 16:58

## 2021-08-16 RX ADMIN — POTASSIUM CHLORIDE 20 MEQ: 14.9 INJECTION, SOLUTION INTRAVENOUS at 09:53

## 2021-08-16 RX ADMIN — HYDROCORTISONE: 1 CREAM TOPICAL at 09:56

## 2021-08-16 RX ADMIN — SODIUM CHLORIDE, SODIUM LACTATE, POTASSIUM CHLORIDE, AND CALCIUM CHLORIDE 100 ML/HR: .6; .31; .03; .02 INJECTION, SOLUTION INTRAVENOUS at 10:16

## 2021-08-16 RX ADMIN — SODIUM CHLORIDE, SODIUM LACTATE, POTASSIUM CHLORIDE, AND CALCIUM CHLORIDE 100 ML/HR: .6; .31; .03; .02 INJECTION, SOLUTION INTRAVENOUS at 00:31

## 2021-08-16 NOTE — ASSESSMENT & PLAN NOTE
· Abdominal cramping at diarrhea for 8 days  · CT abdomen pelvis: No acute intra-abdominal abnormality  No free air or free fluid  Nondilated fluid-filled small bowel are noted throughout the abdomen or pelvis which may be related to a mild gastroenteritis  No large or small bowel obstruction  · Normal saline at 75 mL/hour  · Supportive care  · C diff negative and enteric panel neg  Giardia pending and fecal calprotectin ordered  · Patient was reporting that she is having close to 20 loose liquidy bowel movements on Saturday and 11 on Sunday  initially stool was greenish color and now they are yellowish color  Also complaining of increasing abdominal cramping   · Ask GI for consultation    Will place on soft lactose pre meal and observe

## 2021-08-16 NOTE — ASSESSMENT & PLAN NOTE
· Rash to left arm 4 cm round, red, crusting with small area of erythema to right flank and left chest  ·  Placed on hydrocortisone topical ointment and now improving

## 2021-08-16 NOTE — DISCHARGE SUMMARY
[address]  Discharge- Norberto Renny 1995, 27 y o  female MRN: [id number]  Unit/Bed#: -[facility] Encounter: [id number]  Primary Care Provider: Josie Cortez MD   Date and time admitted to hospital: 8/14/2021  3:21 AM    * Gastroenteritis  Assessment & Plan  · Abdominal cramping at diarrhea for 8 days  · CT abdomen pelvis: No acute intra-abdominal abnormality  No free air or free fluid  Nondilated fluid-filled small bowel are noted throughout the abdomen or pelvis which may be related to a mild gastroenteritis  No large or small bowel obstruction  · Normal saline at 75 mL/hour  · Supportive care  · C diff negative and enteric panel neg  Giardia pending and fecal calprotectin ordered  · Patient was reporting that she is having close to 20 loose liquidy bowel movements on Saturday and 11 on Sunday  · Diarrhea has resolved and abdominal pain has resolved  Tolerated soft diet  Will discharge home today with outpatient follow-up with PCP and or GI        Rash  Assessment & Plan  · Rash to left arm 4 cm round, red, crusting with small area of erythema to right flank and left chest  ·  Placed on hydrocortisone topical ointment and now improving    Psychiatric illness  Assessment & Plan  · History of PSTD, bipolar 1 disorder, borderline personality disorder  · She is not on prescription meds-she takes supplements including Ashwaganda  · Supportive care    Hypokalemia  Assessment & Plan  · Potassium is 3 3 today     Given supplementation      Discharging Physician / Practitioner: Cammie Haskins MD  PCP: Josie Cortez MD  Admission Date:   Admission Orders (From admission, onward)     Ordered        08/15/21 1151  Inpatient Admission  Once         08/14/21 0700  Place in Observation  Once                   Discharge Date: 08/16/21    Medical Problems     Resolved Problems  Date Reviewed: 8/16/2021    None                Consultations During Hospital Stay:  · GI    Procedures Performed:     · None    Significant Findings / Test Results:     CT abdomen pelvis wo contrast    Result Date: 8/14/2021  Impression: No acute intra-abdominal abnormality  No free air or free fluid  Nondilated fluid-filled small bowel are noted throughout the abdomen or pelvis which may be related to a mild gastroenteritis  No large or small bowel obstruction  Workstation performed: [id number]     Incidental Findings:   · None     Test Results Pending at Discharge (will require follow up): · None     Outpatient Tests Requested:  · None    Complications:  None    Reason for Admission:  Intractable abdominal pain and diarrhea    Hospital Course:     Servando Giordano is a 27 y o  female patient who originally presented to the hospital on 8/14/2021 due to acute viral gastroenteritis  Patient has had 7-8 days nausea vomiting diarrhea and abdominal pain which has now finally resolved  Tolerating a soft diet  Stool cultures including C diff and enteric panel are negative  Discharge home on a soft diet and follow up outpatient PCP    Please see above list of diagnoses and related plan for additional information  Condition at Discharge: good     Discharge Day Visit / Exam:     * Please refer to separate progress note for these details *    Discussion with Family:  Discussed with family at bedside    Discharge instructions/Information to patient and family:   See after visit summary for information provided to patient and family  Provisions for Follow-Up Care:  See after visit summary for information related to follow-up care and any pertinent home health orders  Disposition:     Home    For Discharges to [facility]:   · Not Applicable to this Patient - Not Applicable to this Patient    Planned Readmission:  None     Discharge Statement:  I spent 35 minutes discharging the patient  This time was spent on the day of discharge  I had direct contact with the patient on the day of discharge  Greater than 50% of the total time was spent examining patient, answering all patient questions, arranging and discussing plan of care with patient as well as directly providing post-discharge instructions  Additional time then spent on discharge activities  Discharge Medications:  See after visit summary for reconciled discharge medications provided to patient and family        ** Please Note: This note has been constructed using a voice recognition system **

## 2021-08-16 NOTE — NURSING NOTE
Peripheral IV removed  Belongings reviewed  AVS reviewed with pt, verbalized understanding  Pt walking out with mother at discharge  No medications to return

## 2021-08-16 NOTE — DISCHARGE INSTR - AVS FIRST PAGE
Please consume a soft diet for the next 3-4 days  Avoid any deep fried spicy or saucy foods    You may consume fruits and well cooked vegetables, chicken and fish  Avoid dairy products for the next 1 week

## 2021-08-16 NOTE — CONSULTS
Consultation - [facility]  Ifeanyimunira Bender 27 y o  female MRN: [id number]  Unit/Bed#: -01 Encounter: [id number]        Consults    Reason for Consult / Principal Problem:     Acute diarrhea  Nausea with vomiting  Abdominal pain      ASSESSMENT AND PLAN:      Acute diarrhea  Nausea with vomiting  Abdominal pain  -symptoms and history are consistent with likely infectious etiology probably viral  -C diff and stool enteric panel negative, Giardia pending  -patient tolerated lunch today last bowel movement was yesterday  -supportive measures  -Bentyl Levsin as needed for abdominal cramping  -PPI x1 month  -bland diet and advance as tolerated, avoid dairy for 2 weeks  -suggested outpatient GI follow-up in 1 month, however patient would like to think about this; I provided her my business card; given that she has chronic abdominal symptoms this likely warrants further workup including additional stool studies, blood work and likely endoscopic evaluation      ______________________________________________________________________    HPI:  18-year-old female seen in consultation for acute diarrhea with associated nausea and vomiting  She has significant past medical history for fibromyalgia, migraine, endometriosis, pelvic floor dysfunction, bipolar, borderline personality disorder, PTSD sciatica depression, interstitial cystitis, IBS  She is on lot herbal supplements for management of her chronic conditions  She is also taking antibiotics and Zofran as needed  She has medical marijuana card and uses daily  She presents with complaints of nausea, vomiting, nonbloody diarrhea for the past 9 days more progressive in nature associated with abdominal pain and abdominal cramping without fever  She is unclear if she ate anything different however she notes that people in her household had similar symptoms    She initially presented to the ED on 08/11 and was discharged after IV hydration as she felt better however symptoms returned and worsened therefore she came back on 08/14 and was admitted  She denies having colonoscopy in the past   She denies excessive NSAID use or alcohol use  She denies family history of IBD  CT scan showed signs of gastroenteritis otherwise within acceptable limits  REVIEW OF SYSTEMS:    CONSTITUTIONAL: Denies any fever, chills, rigors, and weight loss  HEENT: No earache or tinnitus  Denies hearing loss or visual disturbances  CARDIOVASCULAR: No chest pain or palpitations  RESPIRATORY: Denies any cough, hemoptysis, shortness of breath or dyspnea on exertion  GASTROINTESTINAL: As noted in the History of Present Illness  GENITOURINARY: No problems with urination  Denies any hematuria or dysuria  NEUROLOGIC: No dizziness or vertigo, denies headaches  MUSCULOSKELETAL: Denies any muscle or joint pain  SKIN: Denies skin rashes or itching  ENDOCRINE: Denies excessive thirst  Denies intolerance to heat or cold  PSYCHOSOCIAL: Denies depression or anxiety  Denies any recent memory loss  Historical Information   Past Medical History:   Diagnosis Date    Anxiety     Arthritis     Bipolar 1 disorder ([facility] )     Borderline personality disorder ([facility] )     Calculus of GB/BD, acute cystis w/ obst     Chronic pain     Depression     Endometriosis     Fibromyalgia     History of stomach ulcers     Migraine     Pelvic floor dysfunction     PTSD (post-traumatic stress disorder)      Past Surgical History:   Procedure Laterality Date    ADENOIDECTOMY      APPENDECTOMY      TONSILLECTOMY       Social History   Social History     Substance and Sexual Activity   Alcohol Use Yes    Comment: once a year     Social History     Substance and Sexual Activity   Drug Use Yes    Types: Marijuana     Social History     Tobacco Use   Smoking Status Never Smoker   Smokeless Tobacco Never Used     No family history on file      Meds/Allergies     Medications Prior to Admission   Medication    lidocaine-prilocaine (EMLA) cream    NON FORMULARY    NON FORMULARY    NON FORMULARY    NON FORMULARY    NON FORMULARY    NON FORMULARY    NON FORMULARY    NON FORMULARY    ondansetron (ZOFRAN-ODT) 4 mg disintegrating tablet    Probiotic Product (PROBIOTIC ADVANCED PO)     Current Facility-Administered Medications   Medication Dose Route Frequency    hydrocortisone 1 % cream   Topical TID    lactated ringers infusion  100 mL/hr Intravenous Continuous    ondansetron (ZOFRAN) injection 4 mg  4 mg Intravenous Q6H PRN       Allergies   Allergen Reactions    Amoxicillin Itching and Shortness Of Breath     Other reaction(s): Other (Please comment)  S  OB, rapid heart rate, chest pain      Atomoxetine Anaphylaxis and Swelling     Other reaction(s): Edema airway    Iodinated Diagnostic Agents Hives    Metoclopramide Anaphylaxis, Edema, Shortness Of Breath and Swelling     Other reaction(s): Edema face/lips/tongue  Twitching and throat closing  hives      Progesterone Shortness Of Breath and Chest Pain    Guaifenesin      Other reaction(s): Other (See Comments)  Felt very depressed     Fish Oil - Food Allergy Itching    Iodine Solution [Povidone Iodine] Hives           Objective     Blood pressure 111/69, pulse 92, temperature 97 6 °F (36 4 °C), resp  rate 17, height 5' 5" (1 651 m), weight 49 7 kg (109 lb 9 1 oz), last menstrual period 06/08/2018, SpO2 95 %  Body mass index is 18 23 kg/m²        Intake/Output Summary (Last 24 hours) at 8/16/2021 1531  Last data filed at 8/16/2021 1302  Gross per 24 hour   Intake 2538 33 ml   Output 3125 ml   Net -586 67 ml         PHYSICAL EXAM:      General Appearance:   Alert, cooperative, no distress   HEENT:   Normocephalic, atraumatic, anicteric      Neck:  Supple, symmetrical, trachea midline   Lungs:   respirations unlabored    Heart[de-identified]   Regular rate   Abdomen:   Mild epigastric tenderness , soft, non-distended    Genitalia:   Deferred  Rectal:   Deferred    Extremities:  No cyanosis, clubbing or edema        Skin:  No jaundice, rashes, or lesions    Lymph nodes:  No palpable cervical lymphadenopathy        Lab Results:   Admission on 08/14/2021   Component Date Value    WBC 08/14/2021 4 06*    RBC 08/14/2021 4 78     Hemoglobin 08/14/2021 14 5     Hematocrit 08/14/2021 42 0     MCV 08/14/2021 88     MCH 08/14/2021 30 3     MCHC 08/14/2021 34 5     RDW 08/14/2021 12 6     MPV 08/14/2021 11 6     Platelets 52/61/6716 202     Sodium 08/14/2021 136     Potassium 08/14/2021 3 3*    Chloride 08/14/2021 100     CO2 08/14/2021 21     ANION GAP 08/14/2021 15*    BUN 08/14/2021 7     Creatinine 08/14/2021 0 75     Glucose 08/14/2021 81     Calcium 08/14/2021 8 3     AST 08/14/2021 33     ALT 08/14/2021 25     Alkaline Phosphatase 08/14/2021 51     Total Protein 08/14/2021 7 2     Albumin 08/14/2021 3 6     Total Bilirubin 08/14/2021 0 38     eGFR 08/14/2021 109     Lipase 08/14/2021 92     LACTIC ACID 08/14/2021 0 8      C difficile toxin by PC* 08/14/2021 Negative     Salmonella sp PCR 08/14/2021 None Detected     Shigella sp/Enteroinvasi* 08/14/2021 None Detected     Campylobacter sp (jejuni* 08/14/2021 None Detected     Shiga toxin 1/Shiga toxi* 08/14/2021 None Detected     Magnesium 08/14/2021 1 7     Phosphorus 08/14/2021 3 1     Segmented % 08/14/2021 44     Bands % 08/14/2021 4     Lymphocytes % 08/14/2021 32     Monocytes % 08/14/2021 10     Eosinophils, % 08/14/2021 2     Basophils % 08/14/2021 0     Atypical Lymphocytes % 08/14/2021 8*    Absolute Neutrophils 08/14/2021 1 95     Lymphocytes Absolute 08/14/2021 1 30     Monocytes Absolute 08/14/2021 0 41     Eosinophils Absolute 08/14/2021 0 08     Basophils Absolute 08/14/2021 0 00     Total Counted 08/14/2021 100     Platelet Estimate 05/53/4108 Adequate     Sodium 08/15/2021 141     Potassium 08/15/2021 3 8     Chloride 08/15/2021 109*    CO2 08/15/2021 19*    ANION GAP 08/15/2021 13     BUN 08/15/2021 3*    Creatinine 08/15/2021 0 80     Glucose 08/15/2021 74     Glucose, Fasting 08/15/2021 74     Calcium 08/15/2021 8 5     eGFR 08/15/2021 101     WBC 08/15/2021 3 48*    RBC 08/15/2021 4 54     Hemoglobin 08/15/2021 13 4     Hematocrit 08/15/2021 40 7     MCV 08/15/2021 90     MCH 08/15/2021 29 5     MCHC 08/15/2021 32 9     RDW 08/15/2021 12 9     Platelets 98/33/0029 187     MPV 08/15/2021 11 4     Sodium 08/16/2021 141     Potassium 08/16/2021 3 3*    Chloride 08/16/2021 105     CO2 08/16/2021 24     ANION GAP 08/16/2021 12     BUN 08/16/2021 1*    Creatinine 08/16/2021 0 76     Glucose 08/16/2021 82     Calcium 08/16/2021 8 8     eGFR 08/16/2021 107     Procalcitonin 08/16/2021 <0 05     WBC 08/16/2021 3 31*    RBC 08/16/2021 4 59     Hemoglobin 08/16/2021 14 0     Hematocrit 08/16/2021 40 8     MCV 08/16/2021 89     MCH 08/16/2021 30 5     MCHC 08/16/2021 34 3     RDW 08/16/2021 13 2     Platelets 98/31/8010 187     MPV 08/16/2021 11 1        Imaging Studies: I have personally reviewed pertinent imaging studies

## 2021-08-16 NOTE — ASSESSMENT & PLAN NOTE
· Potassium is 3 3 today   ordered potassium replacement  · Continue IV fluid hydration and recheck BMP tomorrow

## 2021-08-16 NOTE — ASSESSMENT & PLAN NOTE
· Abdominal cramping at diarrhea for 8 days  · CT abdomen pelvis: No acute intra-abdominal abnormality  No free air or free fluid  Nondilated fluid-filled small bowel are noted throughout the abdomen or pelvis which may be related to a mild gastroenteritis  No large or small bowel obstruction  · Normal saline at 75 mL/hour  · Supportive care  · C diff negative and enteric panel neg  Giardia pending and fecal calprotectin ordered  · Patient was reporting that she is having close to 20 loose liquidy bowel movements on Saturday and 11 on Sunday  · Diarrhea has resolved and abdominal pain has resolved  Tolerated soft diet    Will discharge home today with outpatient follow-up with PCP and or GI

## 2021-08-16 NOTE — PROGRESS NOTES
[facility]  Progress Note - Peyton Brown 1995, 27 y o  female MRN: [id number]  Unit/Bed#: -01 Encounter: [id number]  Primary Care Provider: Juliana Morales MD   Date and time admitted to hospital: 8/14/2021  3:21 AM    * Gastroenteritis  Assessment & Plan  · Abdominal cramping at diarrhea for 8 days  · CT abdomen pelvis: No acute intra-abdominal abnormality  No free air or free fluid  Nondilated fluid-filled small bowel are noted throughout the abdomen or pelvis which may be related to a mild gastroenteritis  No large or small bowel obstruction  · Normal saline at 75 mL/hour  · Supportive care  · C diff negative and enteric panel neg  Giardia pending and fecal calprotectin ordered  · Patient was reporting that she is having close to 20 loose liquidy bowel movements on Saturday and 11 on Sunday  initially stool was greenish color and now they are yellowish color  Also complaining of increasing abdominal cramping   · Ask GI for consultation  Will place on soft lactose pre meal and observe        Rash  Assessment & Plan  · Rash to left arm 4 cm round, red, crusting with small area of erythema to right flank and left chest  · Serial exams, monitor for worsening  Placed on hydrocortisone topical ointment    Psychiatric illness  Assessment & Plan  · History of PSTD, bipolar 1 disorder, borderline personality disorder  · She is not on prescription meds-she takes supplements including Ashwaganda  · Supportive care    Hypokalemia  Assessment & Plan  · Potassium is 3 3 today   ordered potassium replacement  · Continue IV fluid hydration and recheck BMP tomorrow      VTE Pharmacologic Prophylaxis:   Pharmacologic: Pharmacologic VTE Prophylaxis contraindicated due to Encourage ambulation  Mechanical VTE Prophylaxis in Place: Yes    Patient Centered Rounds: I have performed bedside rounds with nursing staff today      Discussions with Specialists or Other Care Team Provider: Discussed with GI    Education and Discussions with Family / Patient:  Discussed with patient at bedside    Time Spent for Care: 30 minutes  More than 50% of total time spent on counseling and coordination of care as described above  Current Length of Stay: 1 day(s)    Current Patient Status: Inpatient   Certification Statement: The patient will continue to require additional inpatient hospital stay due to Acute gastroenteritis    Discharge Plan:  Pending progress    Code Status: Level 1 - Full Code      Subjective:   Patient denies any chest pain or shortness of breath  States that yesterday she had increasing abdominal cramping and had 11 episodes of loose stools  None overnight as she was NPO  Objective:     Vitals:   Temp (24hrs), Av °F (36 7 °C), Min:97 8 °F (36 6 °C), Max:98 3 °F (36 8 °C)    Temp:  [97 8 °F (36 6 °C)-98 3 °F (36 8 °C)] 98 3 °F (36 8 °C)  HR:  [71-81] 81  Resp:  [17-18] 17  BP: (107-112)/(60-68) 112/68  SpO2:  [97 %-98 %] 97 %  Body mass index is 18 23 kg/m²  Input and Output Summary (last 24 hours): Intake/Output Summary (Last 24 hours) at 2021 1205  Last data filed at 2021 1016  Gross per 24 hour   Intake 2538 33 ml   Output 3600 ml   Net -1061 67 ml       Physical Exam:     Physical Exam  Vitals and nursing note reviewed  Constitutional:       Appearance: Normal appearance  HENT:      Head: Normocephalic and atraumatic  Right Ear: External ear normal       Left Ear: External ear normal       Nose: Nose normal       Mouth/Throat:      Pharynx: Oropharynx is clear  Cardiovascular:      Rate and Rhythm: Normal rate and regular rhythm  Heart sounds: Normal heart sounds  Pulmonary:      Effort: Pulmonary effort is normal       Breath sounds: Normal breath sounds  Abdominal:      General: Bowel sounds are normal       Palpations: Abdomen is soft  Tenderness: There is no abdominal tenderness     Musculoskeletal:         General: Normal range of motion  Cervical back: Normal range of motion and neck supple  Skin:     General: Skin is warm and dry  Capillary Refill: Capillary refill takes less than 2 seconds  Neurological:      General: No focal deficit present  Mental Status: She is alert and oriented to person, place, and time  Psychiatric:         Mood and Affect: Mood normal            Additional Data:     Labs:    Results from last 7 days   Lab Units 08/16/21  0514 08/14/21  0431 08/11/21  1836   WBC Thousand/uL 3 31* 4 06* 4 99   HEMOGLOBIN g/dL 14 0 14 5 15 6*   HEMATOCRIT % 40 8 42 0 47 0*   PLATELETS Thousands/uL 187 202 237   BANDS PCT %  --  4  --    NEUTROS PCT %  --   --  68   LYMPHS PCT %  --   --  18   LYMPHO PCT %  --  32  --    MONOS PCT %  --   --  12   MONO PCT %  --  10  --    EOS PCT %  --  2 2     Results from last 7 days   Lab Units 08/16/21  0514 08/14/21  0431   SODIUM mmol/L 141 136   POTASSIUM mmol/L 3 3* 3 3*   CHLORIDE mmol/L 105 100   CO2 mmol/L 24 21   BUN mg/dL 1* 7   CREATININE mg/dL 0 76 0 75   ANION GAP mmol/L 12 15*   CALCIUM mg/dL 8 8 8 3   ALBUMIN g/dL  --  3 6   TOTAL BILIRUBIN mg/dL  --  0 38   ALK PHOS U/L  --  51   ALT U/L  --  25   AST U/L  --  33   GLUCOSE RANDOM mg/dL 82 81                 Results from last 7 days   Lab Units 08/14/21  0431   LACTIC ACID mmol/L 0 8           * I Have Reviewed All Lab Data Listed Above  * Additional Pertinent Lab Tests Reviewed:  [facility] [place] Reviewed    Imaging:    Imaging Reports Reviewed Today Include:  None  Imaging Personally Reviewed by Myself Includes:  None    Recent Cultures (last 7 days):     Results from last 7 days   Lab Units 08/14/21  0540   C DIFF TOXIN B BY PCR  Negative       Last 24 Hours Medication List:   Current Facility-Administered Medications   Medication Dose Route Frequency Provider Last Rate    hydrocortisone   Topical TID Kolton Huffman MD      lactated ringers  100 mL/hr Intravenous Continuous Beni Monk MD Nick 100 mL/hr (08/16/21 1016)    ondansetron  4 mg Intravenous Q6H PRN RADHAMES Mohr          Today, Patient Was Seen By: Dieudonne Richter MD    ** Please Note: Dictation voice to text software may have been used in the creation of this document   **

## 2021-08-16 NOTE — PROGRESS NOTES
Pt reports eating 2 5 meals per day, typically grazes throughout the day  Tries to get balanced intake of foods, aims to eat chicken 3 times per week, smoothie bowls with fruits and vegetables blended in, has chips here and there as she desires, etc  pt reports avoiding histamine and oxylate containing foods  Says she has a hx of eating disorder and avoids following high kcal/high PRO diet which triggers eating disorder habits  Says she was dx with lactose intolerance at a young age  Pt reports weighing 122lb at her heaviest when she was on birth control in [employer]  Now UBW of 112lb  She notices she appears thinner than typical and suspects recent weight loss with poor appetite/diarrhea  5/13/20 107lb, 1/11/21 111lb, 8/14/21 109lb, she believes current weight is inaccurate, she would like a current standing weight, will relay to RN  Will complete ongoing assessment for acute malnutrition, pending current standing weight  Pt reports no recent BM, believes diarrhea symptom has improved, will hold off on banatrol at this time  Pt reports previously trying ensure original though this was not tolerated, she is agreeable to trialing ensure plant based protein daily, will order  Can continue diet as ordered at this time, pending results of GI consult

## 2021-08-17 ENCOUNTER — HOSPITAL ENCOUNTER (EMERGENCY)
Facility: HOSPITAL | Age: 26
Discharge: HOME | End: 2021-08-17
Attending: EMERGENCY MEDICINE
Payer: MEDICARE

## 2021-08-17 VITALS
BODY MASS INDEX: 18.29 KG/M2 | WEIGHT: 109.79 LBS | OXYGEN SATURATION: 98 % | HEIGHT: 65 IN | DIASTOLIC BLOOD PRESSURE: 57 MMHG | SYSTOLIC BLOOD PRESSURE: 107 MMHG | RESPIRATION RATE: 19 BRPM | HEART RATE: 86 BPM | TEMPERATURE: 98.4 F

## 2021-08-17 DIAGNOSIS — R19.7 DIARRHEA, UNSPECIFIED TYPE: Primary | ICD-10-CM

## 2021-08-17 LAB
ALBUMIN SERPL BCP-MCNC: 3.6 G/DL (ref 3.5–5)
ALP SERPL-CCNC: 41 U/L (ref 46–116)
ALT SERPL W P-5'-P-CCNC: 29 U/L (ref 12–78)
ANION GAP SERPL CALCULATED.3IONS-SCNC: 14 MMOL/L (ref 4–13)
AST SERPL W P-5'-P-CCNC: 36 U/L (ref 5–45)
BASOPHILS # BLD MANUAL: 0.04 THOUSAND/UL (ref 0–0.1)
BASOPHILS NFR MAR MANUAL: 1 % (ref 0–1)
BILIRUB SERPL-MCNC: 0.45 MG/DL (ref 0.2–1)
BUN SERPL-MCNC: 5 MG/DL (ref 5–25)
CALCIUM SERPL-MCNC: 8.7 MG/DL (ref 8.3–10.1)
CHLORIDE SERPL-SCNC: 103 MMOL/L (ref 100–108)
CO2 SERPL-SCNC: 24 MMOL/L (ref 21–32)
CREAT SERPL-MCNC: 0.7 MG/DL (ref 0.6–1.3)
EOSINOPHIL # BLD MANUAL: 0.04 THOUSAND/UL (ref 0–0.4)
EOSINOPHIL NFR BLD MANUAL: 1 % (ref 0–6)
ERYTHROCYTE [DISTWIDTH] IN BLOOD BY AUTOMATED COUNT: 12.9 % (ref 11.6–15.1)
GFR SERPL CREATININE-BSD FRML MDRD: 118 ML/MIN/1.73SQ M
GLUCOSE SERPL-MCNC: 89 MG/DL (ref 65–140)
HCT VFR BLD AUTO: 41.4 % (ref 34.8–46.1)
HGB BLD-MCNC: 14.6 G/DL (ref 11.5–15.4)
LIPASE SERPL-CCNC: 112 U/L (ref 73–393)
LYMPHOCYTES # BLD AUTO: 1.94 THOUSAND/UL (ref 0.6–4.47)
LYMPHOCYTES # BLD AUTO: 45 % (ref 14–44)
MCH RBC QN AUTO: 30.5 PG (ref 26.8–34.3)
MCHC RBC AUTO-ENTMCNC: 35.3 G/DL (ref 31.4–37.4)
MCV RBC AUTO: 86 FL (ref 82–98)
MONOCYTES # BLD AUTO: 0.17 THOUSAND/UL (ref 0–1.22)
MONOCYTES NFR BLD: 4 % (ref 4–12)
NEUTROPHILS # BLD MANUAL: 1.85 THOUSAND/UL (ref 1.85–7.62)
NEUTS BAND NFR BLD MANUAL: 6 % (ref 0–8)
NEUTS SEG NFR BLD AUTO: 37 % (ref 43–75)
NRBC BLD AUTO-RTO: 0 /100 WBCS
PLATELET # BLD AUTO: 225 THOUSANDS/UL (ref 149–390)
PLATELET BLD QL SMEAR: ADEQUATE
PMV BLD AUTO: 11.3 FL (ref 8.9–12.7)
POTASSIUM SERPL-SCNC: 4 MMOL/L (ref 3.5–5.3)
PROT SERPL-MCNC: 7 G/DL (ref 6.4–8.2)
RBC # BLD AUTO: 4.79 MILLION/UL (ref 3.81–5.12)
SODIUM SERPL-SCNC: 141 MMOL/L (ref 136–145)
TOTAL CELLS COUNTED SPEC: 100
VARIANT LYMPHS # BLD AUTO: 6 %
WBC # BLD AUTO: 4.3 THOUSAND/UL (ref 4.31–10.16)

## 2021-08-17 PROCEDURE — 96374 THER/PROPH/DIAG INJ IV PUSH: CPT

## 2021-08-17 PROCEDURE — 85007 BL SMEAR W/DIFF WBC COUNT: CPT | Performed by: EMERGENCY MEDICINE

## 2021-08-17 PROCEDURE — 85025 COMPLETE CBC W/AUTO DIFF WBC: CPT | Performed by: EMERGENCY MEDICINE

## 2021-08-17 PROCEDURE — 99283 EMERGENCY DEPT VISIT LOW MDM: CPT

## 2021-08-17 PROCEDURE — 96375 TX/PRO/DX INJ NEW DRUG ADDON: CPT

## 2021-08-17 PROCEDURE — 99284 EMERGENCY DEPT VISIT MOD MDM: CPT | Performed by: EMERGENCY MEDICINE

## 2021-08-17 PROCEDURE — C9113 INJ PANTOPRAZOLE SODIUM, VIA: HCPCS | Performed by: EMERGENCY MEDICINE

## 2021-08-17 PROCEDURE — 36415 COLL VENOUS BLD VENIPUNCTURE: CPT | Performed by: EMERGENCY MEDICINE

## 2021-08-17 PROCEDURE — 96361 HYDRATE IV INFUSION ADD-ON: CPT

## 2021-08-17 PROCEDURE — 83690 ASSAY OF LIPASE: CPT | Performed by: EMERGENCY MEDICINE

## 2021-08-17 PROCEDURE — 80053 COMPREHEN METABOLIC PANEL: CPT | Performed by: EMERGENCY MEDICINE

## 2021-08-17 RX ORDER — ONDANSETRON 2 MG/ML
4 INJECTION INTRAMUSCULAR; INTRAVENOUS ONCE
Status: COMPLETED | OUTPATIENT
Start: 2021-08-17 | End: 2021-08-17

## 2021-08-17 RX ORDER — DICYCLOMINE HCL 20 MG
20 TABLET ORAL ONCE
Status: COMPLETED | OUTPATIENT
Start: 2021-08-17 | End: 2021-08-17

## 2021-08-17 RX ORDER — PANTOPRAZOLE SODIUM 40 MG/1
40 INJECTION, POWDER, FOR SOLUTION INTRAVENOUS ONCE
Status: COMPLETED | OUTPATIENT
Start: 2021-08-17 | End: 2021-08-17

## 2021-08-17 RX ORDER — LOPERAMIDE HYDROCHLORIDE 2 MG/1
2 CAPSULE ORAL 4 TIMES DAILY PRN
Qty: 12 CAPSULE | Refills: 0 | Status: SHIPPED | OUTPATIENT
Start: 2021-08-17

## 2021-08-17 RX ORDER — DICYCLOMINE HCL 20 MG
20 TABLET ORAL 2 TIMES DAILY
Qty: 20 TABLET | Refills: 0 | Status: SHIPPED | OUTPATIENT
Start: 2021-08-17

## 2021-08-17 RX ADMIN — PANTOPRAZOLE SODIUM 40 MG: 40 INJECTION, POWDER, FOR SOLUTION INTRAVENOUS at 09:30

## 2021-08-17 RX ADMIN — SODIUM CHLORIDE 1000 ML: 0.9 INJECTION, SOLUTION INTRAVENOUS at 09:30

## 2021-08-17 RX ADMIN — DICYCLOMINE HYDROCHLORIDE 20 MG: 20 TABLET ORAL at 09:29

## 2021-08-17 RX ADMIN — ONDANSETRON 4 MG: 2 INJECTION INTRAMUSCULAR; INTRAVENOUS at 09:30

## 2021-08-17 NOTE — ED NOTES
Pt given applesauce, crackers, and water at this time  She will let me know if tolerating ok since she has not been able to eat anything since recent d/c        Maureen Hamm RN  08/17/21 1111

## 2021-08-17 NOTE — ED PROVIDER NOTES
History  Chief Complaint   Patient presents with    Abdominal Pain Re-Eval     Pt reports being seen 3x for this in the past week, pt was admitted to [address] [place] [address] for 3 days for gastroenteritis, discharged home yesterday, still has N/V/D, called M/S, told to come back to ED  Patient has been having abdominal cramping, nausea and vomiting and diarrhea since approximately August 10th, seen here on August 11th and diagnosed with gastroenteritis and discharged, returned on August 14th and admitted for dehydration and continuing diarrhea, evaluated inpatient with negative C diff, negative enteric stool cultures, negative Giardia, improved inpatient, eventually discharged yesterday, returns to the emergency room complaining of same with increased abdominal cramping and voluminous watery diarrhea overnight  States has had vomiting and inability to tolerate PO  No fevers  History provided by:  Patient   used: No    Diarrhea  Quality:  Watery  Severity:  Severe  Onset quality:  Gradual  Timing:  Constant  Progression:  Unchanged  Relieved by:  Nothing  Worsened by:  Nothing  Associated symptoms: abdominal pain and vomiting    Associated symptoms: no chills, no diaphoresis, no fever, no headaches and no myalgias        Prior to Admission Medications   Prescriptions Last Dose Informant Patient Reported? Taking?    NON FORMULARY  Self Yes No   Sig: Med Name: ASHWAGANDHA ROOT   hydrocortisone 1 % cream   No No   Sig: Apply topically 3 (three) times a day   ondansetron (ZOFRAN-ODT) 4 mg disintegrating tablet Past Week at Unknown time  No Yes   Sig: Take 1 tablet (4 mg total) by mouth every 6 (six) hours as needed for nausea or vomiting      Facility-Administered Medications: None       Past Medical History:   Diagnosis Date    Anxiety     Arthritis     Bipolar 1 disorder ([facility])     Borderline personality disorder ([facility] [address] )     Calculus of GB/BD, acute cystis w/ obst     Chronic pain     Depression     Endometriosis     Fibromyalgia     History of stomach ulcers     Migraine     Pelvic floor dysfunction     PTSD (post-traumatic stress disorder)        Past Surgical History:   Procedure Laterality Date    ADENOIDECTOMY      APPENDECTOMY      TONSILLECTOMY         History reviewed  No pertinent family history  I have reviewed and agree with the history as documented  E-Cigarette/Vaping    E-Cigarette Use Never User      E-Cigarette/Vaping Substances     Social History     Tobacco Use    Smoking status: Never Smoker    Smokeless tobacco: Never Used   Vaping Use    Vaping Use: Never used   Substance Use Topics    Alcohol use: Not Currently     Comment: once a year    Drug use: Yes     Types: Marijuana       Review of Systems   Constitutional: Negative for chills, diaphoresis and fever  HENT: Negative for ear pain, hearing loss, sore throat, trouble swallowing and voice change  Eyes: Negative for pain and discharge  Respiratory: Negative for cough, shortness of breath and wheezing  Cardiovascular: Negative for chest pain and palpitations  Gastrointestinal: Positive for abdominal pain, diarrhea and vomiting  Negative for blood in stool, constipation and nausea  Genitourinary: Negative for dysuria, flank pain, frequency and hematuria  Musculoskeletal: Negative for joint swelling, myalgias, neck pain and neck stiffness  Skin: Negative for rash and wound  Neurological: Negative for dizziness, seizures, syncope, facial asymmetry and headaches  Psychiatric/Behavioral: Negative for hallucinations, self-injury and suicidal ideas  All other systems reviewed and are negative  Physical Exam  Physical Exam  Vitals and nursing note reviewed  Constitutional:       General: She is not in acute distress  Appearance: She is well-developed  HENT:      Head: Normocephalic and atraumatic        Right Ear: External ear normal       Left Ear: External ear normal    Eyes:      General: No scleral icterus  Right eye: No discharge  Left eye: No discharge  Extraocular Movements: Extraocular movements intact  Conjunctiva/sclera: Conjunctivae normal    Cardiovascular:      Rate and Rhythm: Regular rhythm  Tachycardia present  Heart sounds: Normal heart sounds  No murmur heard  Pulmonary:      Effort: Pulmonary effort is normal       Breath sounds: Normal breath sounds  No wheezing or rales  Abdominal:      General: Bowel sounds are normal  There is no distension  Palpations: Abdomen is soft  Tenderness: There is no abdominal tenderness  There is no guarding or rebound  Musculoskeletal:         General: No deformity  Normal range of motion  Cervical back: Normal range of motion and neck supple  Skin:     General: Skin is warm and dry  Findings: No rash  Neurological:      General: No focal deficit present  Mental Status: She is alert and oriented to person, place, and time  Cranial Nerves: No cranial nerve deficit  Psychiatric:         Mood and Affect: Mood normal          Behavior: Behavior normal          Thought Content:  Thought content normal          Judgment: Judgment normal          Vital Signs  ED Triage Vitals [08/17/21 0905]   Temperature Pulse Respirations Blood Pressure SpO2   98 4 °F (36 9 °C) (!) 107 19 134/68 99 %      Temp Source Heart Rate Source Patient Position - Orthostatic VS BP Location FiO2 (%)   Temporal Monitor Lying Right arm --      Pain Score       9           Vitals:    08/17/21 1000 08/17/21 1030 08/17/21 1100 08/17/21 1130   BP: (!) 91/48 109/66 104/56 105/56   Pulse: 78 87 86 92   Patient Position - Orthostatic VS: Lying Lying Lying Lying         Visual Acuity      ED Medications  Medications   ondansetron (ZOFRAN) injection 4 mg (4 mg Intravenous Given 8/17/21 0930)   sodium chloride 0 9 % bolus 1,000 mL (0 mL Intravenous Stopped 8/17/21 1030)   pantoprazole (PROTONIX) injection 40 mg (40 mg Intravenous Given 8/17/21 0930)   dicyclomine (BENTYL) tablet 20 mg (20 mg Oral Given 8/17/21 0929)       Diagnostic Studies  Results Reviewed     Procedure Component Value Units Date/Time    Manual Differential(PHLEBS Do Not Order) [[id number]]  (Abnormal) Collected: 08/17/21 0929    Lab Status: Final result Specimen: Blood from Arm, Left Updated: 08/17/21 1056     Segmented % 37 %      Bands % 6 %      Lymphocytes % 45 %      Monocytes % 4 %      Eosinophils, % 1 %      Basophils % 1 %      Atypical Lymphocytes % 6 %      Absolute Neutrophils 1 85 Thousand/uL      Lymphocytes Absolute 1 94 Thousand/uL      Monocytes Absolute 0 17 Thousand/uL      Eosinophils Absolute 0 04 Thousand/uL      Basophils Absolute 0 04 Thousand/uL      Total Counted 100     Platelet Estimate Adequate    Comprehensive metabolic panel [[id number]]  (Abnormal) Collected: 08/17/21 0929    Lab Status: Final result Specimen: Blood from Arm, Left Updated: 08/17/21 0956     Sodium 141 mmol/L      Potassium 4 0 mmol/L      Chloride 103 mmol/L      CO2 24 mmol/L      ANION GAP 14 mmol/L      BUN 5 mg/dL      Creatinine 0 70 mg/dL      Glucose 89 mg/dL      Calcium 8 7 mg/dL      AST 36 U/L      ALT 29 U/L      Alkaline Phosphatase 41 U/L      Total Protein 7 0 g/dL      Albumin 3 6 g/dL      Total Bilirubin 0 45 mg/dL      eGFR 118 ml/min/1 73sq m     Narrative:      [place]z guidelines for Chronic Kidney Disease (CKD):     Stage 1 with normal or high GFR (GFR > 90 mL/min/1 73 square meters)    Stage 2 Mild CKD (GFR = 60-89 mL/min/1 73 square meters)    Stage 3A Moderate CKD (GFR = 45-59 mL/min/1 73 square meters)    Stage 3B Moderate CKD (GFR = 30-44 mL/min/1 73 square meters)    Stage 4 Severe CKD (GFR = 15-29 mL/min/1 73 square meters)    Stage 5 End Stage CKD (GFR <15 mL/min/1 73 square meters)  Note: GFR calculation is accurate only with a steady state creatinine    Lipase [[id number]]  (Normal) Collected: 08/17/21 0929    Lab Status: Final result Specimen: Blood from Arm, Left Updated: 08/17/21 0956     Lipase 112 u/L     CBC and differential [[id number]]  (Abnormal) Collected: 08/17/21 0929    Lab Status: Final result Specimen: Blood from Arm, Left Updated: 08/17/21 0953     WBC 4 30 Thousand/uL      RBC 4 79 Million/uL      Hemoglobin 14 6 g/dL      Hematocrit 41 4 %      MCV 86 fL      MCH 30 5 pg      MCHC 35 3 g/dL      RDW 12 9 %      MPV 11 3 fL      Platelets 323 Thousands/uL      nRBC 0 /100 WBCs     Narrative: This is an appended report  These results have been appended to a previously verified report  No orders to display              Procedures  Procedures         ED Course  ED Course as of Aug 17 1202   Tue Aug 17, 2021   1127 Patient able to eat small amounts of applesauce, saltine crackers, drink water without vomiting  She complains of abdominal cramping but she was able to tolerate the oral ingestion  She has not had vomiting or diarrhea since arrival in the emergency department      1158 Discussed with GI Dr Eduar Mc, agrees patient is appropriate for discharge, recommends addition of Imodium, Bentyl for treatment of diarrhea and abdominal cramping  Recommends patient follow-up on an outpatient basis  Have discussed this plan with the patient who is agreeable  SBIRT 22yo+      Most Recent Value   SBIRT (22 yo +)   In order to provide better care to our patients, we are screening all of our patients for alcohol and drug use  Would it be okay to ask you these screening questions? Yes Filed at: 08/17/2021 0915   Initial Alcohol Screen: US AUDIT-C    1  How often do you have a drink containing alcohol?  0 Filed at: 08/17/2021 0915   2  How many drinks containing alcohol do you have on a typical day you are drinking? 0 Filed at: 08/17/2021 0915   3b  FEMALE Any Age, or MALE 65+: How often do you have 4 or more drinks on one occassion?   0 Filed at: 08/17/2021 0915   Audit-C Score  0 Filed at: 08/17/2021 5910   ELIGIO: How many times in the past year have you    Used an illegal drug or used a prescription medication for non-medical reasons? Never Filed at: 08/17/2021 0915                    [facility]  Number of Diagnoses or Management Options     Amount and/or Complexity of Data Reviewed  Clinical lab tests: ordered and reviewed  Decide to obtain previous medical records or to obtain history from someone other than the patient: yes  Review and summarize past medical records: yes  Independent visualization of images, tracings, or specimens: yes        Disposition  Final diagnoses:   Diarrhea, unspecified type     Time reflects when diagnosis was documented in both MDM as applicable and the Disposition within this note     Time User Action Codes Description Comment    8/17/2021 11:59 AM Lashonda Rdz Add [R19 7] Diarrhea, unspecified type       ED Disposition     ED Disposition Condition Date/Time Comment    Discharge Stable Tue Aug 17, 2021 11:59 AM Angélica Meeks discharge to home/self care  Follow-up Information     Follow up With Specialties Details Why Contact Info    Demi Haynes MD Internal Medicine   [address] [place] [address]      Christal Ross MD Gastroenterology   [address] [place], [place] [id number] [phone]            Patient's Medications   Discharge Prescriptions    DICYCLOMINE (BENTYL) 20 MG TABLET    Take 1 tablet (20 mg total) by mouth 2 (two) times a day       Start Date: 8/17/2021 End Date: --       Order Dose: 20 mg       Quantity: 20 tablet    Refills: 0    LOPERAMIDE (IMODIUM) 2 MG CAPSULE    Take 1 capsule (2 mg total) by mouth 4 (four) times a day as needed for diarrhea       Start Date: 8/17/2021 End Date: --       Order Dose: 2 mg       Quantity: 12 capsule    Refills: 0     No discharge procedures on file      PDMP Review     None          ED Provider  Electronically Signed by           Mk Dorado MD  08/17/21 7486

## 2021-08-17 NOTE — DISCHARGE INSTRUCTIONS
Please follow-up with your gastroenterologist or the gastroenterologist listed below  Continue the bland diet  Use the Zofran you have previously been prescribed  Additionally, you may use Imodium and Bentyl which have been prescribed for you      Please arrange gastroenterology follow up:     [facility] call appointment line [phone] OR directly [phone] 512 [facility]  [address] Phone: [phone] Fax: [phone]     [address] [place] [address] [address] Phone: [phone] [address]   207 [facility], [address] Phone: [phone]     701 [facility], [facility]    801 [facility]   1000 [facility], [address] Phone: [phone]

## 2021-08-19 NOTE — PHYSICIAN ADVISOR
Current patient class: Inpatient  The patient is currently on Hospital Day: 3 at [facility]      The patient was admitted to the hospital at 11:51 AM on 8/15/21 for the following diagnosis:  Gastroenteritis [K52 9]  Abdominal pain [R10 9]  Diffuse abdominal pain [R10 84]  Intractable vomiting with nausea, unspecified vomiting type [R11 2]       There is documentation in the medical record of an expected length of stay of at least 2 midnights  The patient is therefore expected to satisfy the 2 midnight benchmark and given the 2 midnight presumption is appropriate for INPATIENT ADMISSION  Given this expectation of a satisfying stay, CMS instructs us that the patient is most often appropriate for inpatient admission under part A provided medical necessity is documented in the chart  After review of the relevant documentation, labs, vital signs and test results, the patient is appropriate for INPATIENT ADMISSION  Admission to the hospital as an inpatient is a complex decision making process which requires the practitioner to consider the patients presenting complaint, history and physical examination and all relevant testing  With this in mind, in this case, the patient was deemed appropriate for INPATIENT ADMISSION  After review of the documentation and testing available at the time of the admission I concur with this clinical determination of medical necessity  Rationale is as follows: The patient is a 27 yrs old Female who presented to the ED at 8/14/2021  3:21 AM with a chief complaint of Abdominal Pain (continued abdominal pain, nausea, and vomiting  dx'd with gastroenteritis 3 days ago  ) Pt seen in ED for same 8/11/21  Abdominal cramping at diarrhea for 8 days, with 20 liquidy bm daily PTA  CT abdomen pelvis: No acute intra-abdominal abnormality   No free air or free fluid   Nondilated fluid-filled small bowel are noted throughout the abdomen or pelvis which may be related to a mild gastroenteritis   No large or small bowel obstruction  She was received on IVF at 76 mL/hour-100ml/hr  She received IV zofran  C diff negative and enteric panel neg  Diarrhea and abdominal pain resolved by discharge         The patients vitals on arrival were   ED Triage Vitals   Temperature Pulse Respirations Blood Pressure SpO2   08/14/21 0328 08/14/21 0328 08/14/21 0328 08/14/21 0328 08/14/21 0328   98 5 °F (36 9 °C) 85 14 119/76 98 %      Temp Source Heart Rate Source Patient Position - Orthostatic VS BP Location FiO2 (%)   08/14/21 0328 08/14/21 0528 08/14/21 0328 08/14/21 0328 --   Temporal Monitor Lying Right arm       Pain Score       08/14/21 0328       Worst Possible Pain           Past Medical History:   Diagnosis Date    Anxiety     Arthritis     Bipolar 1 disorder (HCC)     Borderline personality disorder ([facility])     Calculus of GB/BD, acute cystis w/ obst     Chronic pain     Depression     Endometriosis     Fibromyalgia     History of stomach ulcers     Migraine     Pelvic floor dysfunction     PTSD (post-traumatic stress disorder)      Past Surgical History:   Procedure Laterality Date    ADENOIDECTOMY      APPENDECTOMY      TONSILLECTOMY             Consults have been placed to:   IP CONSULT TO GASTROENTEROLOGY    Vitals:    08/15/21 2233 08/16/21 0658 08/16/21 1426 08/16/21 1436   BP: 107/62 112/68 111/69    Pulse: 71 81 92    Resp: 18 17 17    Temp: 97 8 °F (36 6 °C) 98 3 °F (36 8 °C) 97 6 °F (36 4 °C)    TempSrc:       SpO2: 98% 97% 95%    Weight:       Height:    5' 5" (1 651 m)       Most recent labs:    Recent Labs     08/17/21  0929   WBC 4 30*   HGB 14 6   HCT 41 4      K 4 0   CALCIUM 8 7   BUN 5   CREATININE 0 70   LIPASE 112   AST 36   ALT 29   ALKPHOS 41*       Scheduled Meds:  Continuous Infusions:No current facility-administered medications for this encounter  PRN Meds:      Surgical procedures (if appropriate):

## 2022-10-12 PROBLEM — K52.9 GASTROENTERITIS: Status: RESOLVED | Noted: 2021-08-14 | Resolved: 2022-10-12

## 2023-11-10 DIAGNOSIS — R42 VERTIGO: Primary | ICD-10-CM

## 2023-11-10 RX ORDER — MECLIZINE HYDROCHLORIDE 25 MG/1
25 TABLET ORAL 3 TIMES DAILY PRN
Qty: 30 TABLET | Refills: 0 | Status: SHIPPED | OUTPATIENT
Start: 2023-11-10